# Patient Record
Sex: FEMALE | URBAN - METROPOLITAN AREA
[De-identification: names, ages, dates, MRNs, and addresses within clinical notes are randomized per-mention and may not be internally consistent; named-entity substitution may affect disease eponyms.]

---

## 2021-10-13 ENCOUNTER — PREPPED CHART (OUTPATIENT)
Dept: URBAN - METROPOLITAN AREA CLINIC 6 | Facility: CLINIC | Age: 51
End: 2021-10-13

## 2022-02-21 NOTE — PROGRESS NOTES
Lab: [facility]    OVS    Reason: Amenorrhea/Menopause    LMP: Around May 2019    Have you had any hormone/menopausal labs drawn recently: No

## 2022-02-24 ENCOUNTER — OFFICE VISIT (OUTPATIENT)
Dept: OBGYN CLINIC | Facility: CLINIC | Age: 52
End: 2022-02-24
Payer: COMMERCIAL

## 2022-02-24 VITALS
HEIGHT: 60 IN | SYSTOLIC BLOOD PRESSURE: 120 MMHG | WEIGHT: 139.8 LBS | DIASTOLIC BLOOD PRESSURE: 80 MMHG | BODY MASS INDEX: 27.45 KG/M2

## 2022-02-24 DIAGNOSIS — N95.1 VASOMOTOR SYMPTOMS DUE TO MENOPAUSE: Primary | ICD-10-CM

## 2022-02-24 PROBLEM — F41.1 GAD (GENERALIZED ANXIETY DISORDER): Status: ACTIVE | Noted: 2021-06-07

## 2022-02-24 PROCEDURE — 99203 OFFICE O/P NEW LOW 30 MIN: CPT | Performed by: OBSTETRICS & GYNECOLOGY

## 2022-02-24 RX ORDER — PROGESTERONE 100 MG/1
1 CAPSULE ORAL
Qty: 30 CAPSULE | Refills: 1 | Status: SHIPPED | OUTPATIENT
Start: 2022-02-24 | End: 2022-03-18

## 2022-02-24 RX ORDER — ALPRAZOLAM 0.5 MG/1
TABLET ORAL
COMMUNITY
Start: 2022-02-11

## 2022-02-24 RX ORDER — ESTRADIOL 0.04 MG/D
1 FILM, EXTENDED RELEASE TRANSDERMAL 2 TIMES WEEKLY
Qty: 8 PATCH | Refills: 1 | Status: SHIPPED | OUTPATIENT
Start: 2022-02-24 | End: 2022-03-24

## 2022-03-02 NOTE — PROGRESS NOTES
Assessment/Plan:    We discussed options for treatment of menopause symptoms     - OTC remedies (she is already on DIM estrogen metabolism), we specifically discussed black cohosh   - low dose SSRI/SNRI    - HRT with estrogen and progesterone as she has intact uterus  She is most interested in a trial of HRT  We discussed the risks and benefits at length  She will return in 4-6wks for follow up of symptoms/recheck  She will stop her DIM supplement at this time  We discussed risks of VTE, breast cancer, etc      We also discussed the benefits of aerobic exercise  Vasomotor symptoms due to menopause  -     Progesterone 100 MG CAPS; Take 1 capsule by mouth daily at bedtime  -     estradiol (Vivelle-Dot) 0 0375 MG/24HR; Place 1 patch on the skin 2 (two) times a week      Subjective:      Patient ID: Denver Person is a 49 y o  female  Brady Deras presents today to discuss her ongoing vasomotor and other symptoms of menopause  She has had significant hot flashes/night sweats  Not sleeping well  Mood changes, more irritable  She is very interested in treatment of her symptoms  She has no history of breast cancer, VTE, liver disease, CHD  She is only on vitamin supplementation and xanax PRN       Patient Active Problem List   Diagnosis    ABNER (generalized anxiety disorder)       Current Outpatient Medications:     ALPRAZolam (XANAX) 0 5 mg tablet, TAKE 1 TABLET BY ORAL ROUTE EVERY 6 HOURS AS NEEDED FOR ANXIETY, Disp: , Rfl:     Cholecalciferol (VITAMIN D3 PO), Take by mouth, Disp: , Rfl:     Cyanocobalamin (B-12 PO), Take by mouth, Disp: , Rfl:     Ferrous Sulfate (IRON PO), Take by mouth, Disp: , Rfl:     NON FORMULARY, DIM Estrogen Metabolism, Disp: , Rfl:     estradiol (Vivelle-Dot) 0 0375 MG/24HR, Place 1 patch on the skin 2 (two) times a week, Disp: 8 patch, Rfl: 1    Progesterone 100 MG CAPS, Take 1 capsule by mouth daily at bedtime, Disp: 30 capsule, Rfl: 1    The following portions of the patient's history were reviewed and updated as appropriate: allergies, current medications, past surgical history and problem list     Review of Systems   Constitutional: Positive for diaphoresis and unexpected weight change  Chills: hot flashes/night sweats  Genitourinary: Negative for vaginal bleeding  Psychiatric/Behavioral: Positive for agitation and sleep disturbance  Objective:      /80 (BP Location: Right arm, Patient Position: Sitting, Cuff Size: Standard)   Ht 5' (1 524 m)   Wt 63 4 kg (139 lb 12 8 oz)   LMP 05/01/2019 (Exact Date)   BMI 27 30 kg/m²          Physical Exam  Vitals and nursing note reviewed  Constitutional:       Appearance: Normal appearance  Neurological:      General: No focal deficit present  Mental Status: She is alert     Psychiatric:         Mood and Affect: Mood normal          Behavior: Behavior normal

## 2022-03-07 ENCOUNTER — TELEPHONE (OUTPATIENT)
Dept: OBGYN CLINIC | Facility: CLINIC | Age: 52
End: 2022-03-07

## 2022-03-07 NOTE — TELEPHONE ENCOUNTER
Pt has questions about the medications ordered by CT and feels she is becoming irritable and said something is off     Please advise

## 2022-03-10 NOTE — TELEPHONE ENCOUNTER
It is a low dose, I do not expect it to be causing her that significant of mood symptoms, however if this continues please schedule her an OV to discuss further, consider other options

## 2022-03-10 NOTE — TELEPHONE ENCOUNTER
Pt called saying she stills waiting for the Dr phone call  Pt reported to be anxious and she wants to know if it is a side effect of her medication  Please advise! Thanks!

## 2022-03-18 DIAGNOSIS — N95.1 VASOMOTOR SYMPTOMS DUE TO MENOPAUSE: ICD-10-CM

## 2022-03-18 RX ORDER — PROGESTERONE 100 MG/1
CAPSULE ORAL
Qty: 30 CAPSULE | Refills: 1 | Status: SHIPPED | OUTPATIENT
Start: 2022-03-18 | End: 2022-04-18

## 2022-03-24 ENCOUNTER — OFFICE VISIT (OUTPATIENT)
Dept: OBGYN CLINIC | Facility: CLINIC | Age: 52
End: 2022-03-24
Payer: COMMERCIAL

## 2022-03-24 VITALS
WEIGHT: 142 LBS | BODY MASS INDEX: 27.88 KG/M2 | DIASTOLIC BLOOD PRESSURE: 72 MMHG | SYSTOLIC BLOOD PRESSURE: 120 MMHG | HEIGHT: 60 IN

## 2022-03-24 DIAGNOSIS — N95.1 VASOMOTOR SYMPTOMS DUE TO MENOPAUSE: Primary | ICD-10-CM

## 2022-03-24 DIAGNOSIS — F41.1 GAD (GENERALIZED ANXIETY DISORDER): ICD-10-CM

## 2022-03-24 PROCEDURE — 99214 OFFICE O/P EST MOD 30 MIN: CPT | Performed by: OBSTETRICS & GYNECOLOGY

## 2022-03-24 RX ORDER — ESTRADIOL 0.05 MG/D
1 FILM, EXTENDED RELEASE TRANSDERMAL 2 TIMES WEEKLY
Qty: 24 PATCH | Refills: 1 | Status: SHIPPED | OUTPATIENT
Start: 2022-03-24 | End: 2022-07-21 | Stop reason: SDUPTHER

## 2022-03-24 RX ORDER — BUPROPION HYDROCHLORIDE 150 MG/1
TABLET, EXTENDED RELEASE ORAL
Qty: 60 TABLET | Refills: 1 | Status: SHIPPED | OUTPATIENT
Start: 2022-03-24 | End: 2022-04-18

## 2022-03-28 PROBLEM — N95.1 VASOMOTOR SYMPTOMS DUE TO MENOPAUSE: Status: ACTIVE | Noted: 2022-03-28

## 2022-03-29 NOTE — PROGRESS NOTES
Assessment/Plan:    We discussed her current dosing, and agreed to an increase in her estradiol patch up one dosing option  She will trial this for the next 4-6 weeks  We will plan a virtual visit at that time to see how she is doing  We also discussed a trial of wellbutrin, she will start and then see how she does during this time  If mood symptoms do not improve would advise follow up with her PCP (who manages her xanax)  All questions answered  Vasomotor symptoms due to menopause  -     estradiol (Vivelle-Dot) 0 05 MG/24HR; Place 1 patch on the skin 2 (two) times a week  -     buPROPion (Wellbutrin SR) 150 mg 12 hr tablet; Take 1 tablet every morning x 3 days then increase to q12 hours  ABNER (generalized anxiety disorder)        Subjective:      Patient ID: Doroteo Arevalo is a 49 y o  female  Raegan Bis presents for follow up, at last visit initated HRT for her vasomotor symptoms of menopause, as well as mood symptoms  She has had improvement in her VMS, although not complete resolution, does have some slightly improved sleep  She is concerned about her anxiety and mood symptoms still, and does not think that she can continue without further treatment of this  Has used a variety of medication in the past, and after discussion of options would like to restart wellbutrin as she has tolerated this well in the past      No vaginal bleeding, headaches, other concerns  Tolerating patch and progesterone without difficulty            The following portions of the patient's history were reviewed and updated as appropriate: allergies, current medications and problem list     Patient Active Problem List   Diagnosis    ABNER (generalized anxiety disorder)    Vasomotor symptoms due to menopause       Current Outpatient Medications:     ALPRAZolam (XANAX) 0 5 mg tablet, TAKE 1 TABLET BY ORAL ROUTE EVERY 6 HOURS AS NEEDED FOR ANXIETY, Disp: , Rfl:     Cholecalciferol (VITAMIN D3 PO), Take by mouth, Disp: , Rfl:     Cyanocobalamin (B-12 PO), Take by mouth, Disp: , Rfl:     Ferrous Sulfate (IRON PO), Take by mouth, Disp: , Rfl:     Progesterone 100 MG CAPS, TAKE 1 CAPSULE BY MOUTH EVERYDAY AT BEDTIME, Disp: 30 capsule, Rfl: 1    buPROPion (Wellbutrin SR) 150 mg 12 hr tablet, Take 1 tablet every morning x 3 days then increase to q12 hours  , Disp: 60 tablet, Rfl: 1    estradiol (Vivelle-Dot) 0 05 MG/24HR, Place 1 patch on the skin 2 (two) times a week, Disp: 24 patch, Rfl: 1    No Known Allergies      Review of Systems      Objective:      /72 (BP Location: Left arm, Patient Position: Sitting, Cuff Size: Adult)   Ht 5' (1 524 m)   Wt 64 4 kg (142 lb)   LMP 05/01/2019 (Exact Date)   BMI 27 73 kg/m²          Physical Exam  Vitals and nursing note reviewed  Constitutional:       Appearance: Normal appearance  She is normal weight  Neurological:      General: No focal deficit present  Mental Status: She is alert     Psychiatric:         Mood and Affect: Mood normal          Behavior: Behavior normal

## 2022-04-15 DIAGNOSIS — N95.1 VASOMOTOR SYMPTOMS DUE TO MENOPAUSE: ICD-10-CM

## 2022-04-18 RX ORDER — BUPROPION HYDROCHLORIDE 150 MG/1
TABLET, EXTENDED RELEASE ORAL
Qty: 60 TABLET | Refills: 1 | Status: SHIPPED | OUTPATIENT
Start: 2022-04-18 | End: 2022-05-12

## 2022-04-18 RX ORDER — PROGESTERONE 100 MG/1
CAPSULE ORAL
Qty: 30 CAPSULE | Refills: 1 | Status: SHIPPED | OUTPATIENT
Start: 2022-04-18 | End: 2022-05-12

## 2022-04-29 ENCOUNTER — TELEMEDICINE (OUTPATIENT)
Dept: OBGYN CLINIC | Facility: CLINIC | Age: 52
End: 2022-04-29
Payer: COMMERCIAL

## 2022-04-29 VITALS — BODY MASS INDEX: 22.24 KG/M2 | HEIGHT: 65 IN | WEIGHT: 133.5 LBS

## 2022-04-29 DIAGNOSIS — Z79.890 HORMONE REPLACEMENT THERAPY (HRT): Primary | ICD-10-CM

## 2022-04-29 PROCEDURE — 99212 OFFICE O/P EST SF 10 MIN: CPT | Performed by: OBSTETRICS & GYNECOLOGY

## 2022-04-29 NOTE — PROGRESS NOTES
Lab: [facility]    Virtual Visit    Discuss medication follow-up for menopause  Pt  using Estradiol and Wellbutrin   How are you doing today?  "wonderfuly"     Any questions/concerns: "Not at all"

## 2022-05-12 DIAGNOSIS — N95.1 VASOMOTOR SYMPTOMS DUE TO MENOPAUSE: ICD-10-CM

## 2022-05-12 RX ORDER — BUPROPION HYDROCHLORIDE 150 MG/1
TABLET, EXTENDED RELEASE ORAL
Qty: 60 TABLET | Refills: 1 | Status: SHIPPED | OUTPATIENT
Start: 2022-05-12 | End: 2022-06-10

## 2022-05-12 RX ORDER — PROGESTERONE 100 MG/1
CAPSULE ORAL
Qty: 30 CAPSULE | Refills: 1 | Status: SHIPPED | OUTPATIENT
Start: 2022-05-12 | End: 2022-06-10

## 2022-05-20 ENCOUNTER — TELEPHONE (OUTPATIENT)
Dept: OBGYN CLINIC | Facility: CLINIC | Age: 52
End: 2022-05-20

## 2022-05-20 NOTE — TELEPHONE ENCOUNTER
----- Message from Pearl Alejandro sent at 5/19/2022  7:56 PM EDT -----  Regarding: Possible follow up appointment   I am stating to have some hot flashes and my mood swings are becoming more regular and are beginning to resemble both things prior to starting HRT  They arent as bad but it is becoming a more daily occurrence for both issues and I was hoping to maybe stop it from getting worse if possible      Thank you   Simi Alejandro

## 2022-06-03 ENCOUNTER — TELEPHONE (OUTPATIENT)
Dept: OBGYN CLINIC | Facility: CLINIC | Age: 52
End: 2022-06-03

## 2022-06-03 DIAGNOSIS — Z12.31 ENCOUNTER FOR SCREENING MAMMOGRAM FOR MALIGNANT NEOPLASM OF BREAST: Primary | ICD-10-CM

## 2022-06-03 NOTE — TELEPHONE ENCOUNTER
Pt called to ask for mammo script  She goes to [place] for [employer]  Last mammo 7/14/21  Can we mail to her?

## 2022-06-09 DIAGNOSIS — N95.1 VASOMOTOR SYMPTOMS DUE TO MENOPAUSE: ICD-10-CM

## 2022-06-10 RX ORDER — PROGESTERONE 100 MG/1
CAPSULE ORAL
Qty: 90 CAPSULE | Refills: 1 | Status: SHIPPED | OUTPATIENT
Start: 2022-06-10

## 2022-06-10 RX ORDER — BUPROPION HYDROCHLORIDE 150 MG/1
TABLET, EXTENDED RELEASE ORAL
Qty: 180 TABLET | Refills: 1 | Status: SHIPPED | OUTPATIENT
Start: 2022-06-10

## 2022-06-28 ENCOUNTER — TELEPHONE (OUTPATIENT)
Dept: OBGYN CLINIC | Facility: CLINIC | Age: 52
End: 2022-06-28

## 2022-07-12 ENCOUNTER — TELEPHONE (OUTPATIENT)
Dept: OBGYN CLINIC | Facility: CLINIC | Age: 52
End: 2022-07-12

## 2022-07-12 NOTE — TELEPHONE ENCOUNTER
Pt called to let Joseph Julian know that she has started to bleed as of yesterday with the hormone replacement  What is her next step and her annual is for 7/21  Please advise, thank you

## 2022-07-21 ENCOUNTER — ANNUAL EXAM (OUTPATIENT)
Dept: OBGYN CLINIC | Facility: CLINIC | Age: 52
End: 2022-07-21
Payer: COMMERCIAL

## 2022-07-21 VITALS
BODY MASS INDEX: 25.09 KG/M2 | DIASTOLIC BLOOD PRESSURE: 62 MMHG | WEIGHT: 127.8 LBS | SYSTOLIC BLOOD PRESSURE: 102 MMHG | HEIGHT: 60 IN

## 2022-07-21 DIAGNOSIS — Z01.419 ENCOUNTER FOR GYNECOLOGICAL EXAMINATION (GENERAL) (ROUTINE) WITHOUT ABNORMAL FINDINGS: Primary | ICD-10-CM

## 2022-07-21 DIAGNOSIS — N95.1 VASOMOTOR SYMPTOMS DUE TO MENOPAUSE: ICD-10-CM

## 2022-07-21 DIAGNOSIS — N95.0 POST-MENOPAUSAL BLEEDING: ICD-10-CM

## 2022-07-21 PROCEDURE — G0145 SCR C/V CYTO,THINLAYER,RESCR: HCPCS | Performed by: OBSTETRICS & GYNECOLOGY

## 2022-07-21 PROCEDURE — S0612 ANNUAL GYNECOLOGICAL EXAMINA: HCPCS | Performed by: OBSTETRICS & GYNECOLOGY

## 2022-07-21 PROCEDURE — G0476 HPV COMBO ASSAY CA SCREEN: HCPCS | Performed by: OBSTETRICS & GYNECOLOGY

## 2022-07-21 RX ORDER — ESTRADIOL 0.05 MG/D
1 FILM, EXTENDED RELEASE TRANSDERMAL 2 TIMES WEEKLY
Qty: 24 PATCH | Refills: 1 | Status: SHIPPED | OUTPATIENT
Start: 2022-07-21

## 2022-07-21 NOTE — PROGRESS NOTES
Pt is here for routine annual exam   No concerns at this time    LMP: postmenopausal with vaginal bleeding after starting HRT    Pap x2 per patient/collected today   Mammogram: 7/2022 BIR[facility] 2  Colonoscopy 12/2021 x10yr recall     Not currently sexually active currently going through a divorce     Family History  Breast cancer Mat   Aunt   Colon/ovarian denies

## 2022-07-21 NOTE — PROGRESS NOTES
Haily Alejandro   1970    CC:  Yearly exam    S:  49 y o  female here for yearly exam  She is postmenopausal - did have one episode of bleeding like a period  Had usual symptoms - breast tenderness, etc prior to onset  Felt like a normal cycle  Is on HRT  She denies vaginal discharge, itching, odor or dryness  Sexual activity: She is not currently sexually active, no partner (going through divorce)  She does not report urinary incontinence, urinary concerns, bowel problems, breast concerns  Last Pap: 2yrs ago - normal   Last Mammo: 7/15/22 - [facility] 2 ([place])  Last Colonoscopy: 12/2021 - 10yr recall    We reviewed ASCCP guidelines for Pap testing  Family hx of breast cancer: M Aunt  Family hx of ovarian cancer: no  Family hx of colon cancer: no      Current Outpatient Medications:     ALPRAZolam (XANAX) 0 5 mg tablet, TAKE 1 TABLET BY ORAL ROUTE EVERY 6 HOURS AS NEEDED FOR ANXIETY, Disp: , Rfl:     buPROPion (WELLBUTRIN SR) 150 mg 12 hr tablet, TAKE 1 TABLET EVERY MORNING X 3 DAYS THEN INCREASE TO EVERY 12 HOURS, Disp: 180 tablet, Rfl: 1    Cholecalciferol (VITAMIN D3 PO), Take by mouth, Disp: , Rfl:     Cyanocobalamin (B-12 PO), Take by mouth, Disp: , Rfl:     estradiol (Vivelle-Dot) 0 05 MG/24HR, Place 1 patch on the skin 2 (two) times a week, Disp: 24 patch, Rfl: 1    Ferrous Sulfate (IRON PO), Take by mouth, Disp: , Rfl:     Progesterone 100 MG CAPS, TAKE 1 CAPSULE BY MOUTH EVERYDAY AT BEDTIME, Disp: 90 capsule, Rfl: 1  Patient Active Problem List   Diagnosis    ABNER (generalized anxiety disorder)    Vasomotor symptoms due to menopause     Family History   Problem Relation Age of Onset    Breast cancer Maternal Aunt     Colon cancer Neg Hx     Ovarian cancer Neg Hx      History reviewed  No pertinent past medical history  Review of Systems   Respiratory: Negative  Cardiovascular: Negative  Gastrointestinal: Negative for constipation and diarrhea     Genitourinary: Negative for difficulty urinating, pelvic pain, vaginal bleeding, vaginal discharge, itching or odor  O:  Blood pressure 102/62, height 5' (1 524 m), weight 58 kg (127 lb 12 8 oz), last menstrual period 05/01/2019  Patient appears well and is not in distress  Breasts are symmetrical without mass, tenderness, nipple discharge, skin changes or adenopathy, implants present bilaterally  Abdomen is soft and nontender without masses  External genitals are normal without lesions or rashes  Urethral meatus and urethra are normal  Bladder is normal to palpation  Vagina is normal without discharge or bleeding, generalized atrophic changes present   Cervix is normal without discharge or lesion  Uterus is normal, mobile, nontender without palpable mass  Adnexa are normal, nontender, without palpable mass  A:  Yearly exam      P:   Pap & HPV today   Mammo up to date   Colon Cancer Screening up to date      Bleeding - will check pelvic US, if endometrial stripe thickened to consider EMB  To call if any continue bleeding  RTO one year for yearly exam or sooner as needed

## 2022-07-22 LAB
HPV HR 12 DNA CVX QL NAA+PROBE: NEGATIVE
HPV16 DNA CVX QL NAA+PROBE: NEGATIVE
HPV18 DNA CVX QL NAA+PROBE: NEGATIVE

## 2022-07-27 LAB
LAB AP GYN PRIMARY INTERPRETATION: NORMAL
Lab: NORMAL

## 2022-08-02 ENCOUNTER — TELEPHONE (OUTPATIENT)
Dept: PSYCHIATRY | Facility: CLINIC | Age: 52
End: 2022-08-02

## 2022-08-02 NOTE — TELEPHONE ENCOUNTER
Pt wanted med mgmt I let her know about the wait list  She declined going on the wait list at this time

## 2022-10-20 DIAGNOSIS — N95.1 VASOMOTOR SYMPTOMS DUE TO MENOPAUSE: ICD-10-CM

## 2022-10-20 RX ORDER — ESTRADIOL 0.05 MG/D
1 FILM, EXTENDED RELEASE TRANSDERMAL 2 TIMES WEEKLY
Qty: 24 PATCH | Refills: 0 | Status: CANCELLED | OUTPATIENT
Start: 2022-10-20

## 2022-12-08 ENCOUNTER — TELEPHONE (OUTPATIENT)
Dept: OBGYN CLINIC | Facility: CLINIC | Age: 52
End: 2022-12-08

## 2022-12-08 NOTE — TELEPHONE ENCOUNTER
----- Message from Reynaldo Fitch sent at 12/7/2022  9:14 PM EST -----  Regarding: Hrt patch   Contact: [phone]  Hi Dr Chantale Valle,     I recently went away and forgot to take my patches  with me  I went approximately 4 days without changing it  I have been feeling similar to the way I felt before starting HRT as far as my mood and hot flashes, not being able to sleep as well and I also feel very bloated and feel like I’ve gained some weight  I have put on a new patch but since doing that I have now started to have very sore /sensitive nipples  Would this all be a normal “side effect” of my going without changing my patch for too long and then starting up again when I got back from vacation?      Thank you     Axel Paul

## 2022-12-26 DIAGNOSIS — N95.1 VASOMOTOR SYMPTOMS DUE TO MENOPAUSE: ICD-10-CM

## 2022-12-27 RX ORDER — PROGESTERONE 100 MG/1
CAPSULE ORAL
Qty: 90 CAPSULE | Refills: 1 | Status: SHIPPED | OUTPATIENT
Start: 2022-12-27

## 2022-12-27 RX ORDER — BUPROPION HYDROCHLORIDE 150 MG/1
TABLET, EXTENDED RELEASE ORAL
Qty: 180 TABLET | Refills: 1 | Status: SHIPPED | OUTPATIENT
Start: 2022-12-27

## 2023-01-20 ENCOUNTER — TELEPHONE (OUTPATIENT)
Dept: OBGYN CLINIC | Facility: CLINIC | Age: 53
End: 2023-01-20

## 2023-01-20 DIAGNOSIS — N95.1 VASOMOTOR SYMPTOMS DUE TO MENOPAUSE: ICD-10-CM

## 2023-01-20 RX ORDER — ESTRADIOL 0.05 MG/D
1 FILM, EXTENDED RELEASE TRANSDERMAL 2 TIMES WEEKLY
Qty: 8 PATCH | Refills: 0 | Status: SHIPPED | OUTPATIENT
Start: 2023-01-23

## 2023-02-11 DIAGNOSIS — N95.1 VASOMOTOR SYMPTOMS DUE TO MENOPAUSE: ICD-10-CM

## 2023-02-13 RX ORDER — PROGESTERONE 100 MG/1
CAPSULE ORAL
Qty: 90 CAPSULE | Refills: 2 | Status: SHIPPED | OUTPATIENT
Start: 2023-02-13

## 2023-02-17 ENCOUNTER — TELEPHONE (OUTPATIENT)
Dept: OBGYN CLINIC | Facility: CLINIC | Age: 53
End: 2023-02-17

## 2023-02-17 NOTE — TELEPHONE ENCOUNTER
Pt last seen 7/21/22  Current meds are Wellbutrin, bid, Vivelle dot, and  progesterone caps    States her hot flashes, anxiety, anger and anxiety have started to increase to the point like it was prior to starting med  req further instructions

## 2023-02-17 NOTE — TELEPHONE ENCOUNTER
Pt is having more frequent heat flashes and is extremely tired  She is not sure if it is related to the patch or menopause  Please call pt to discuss

## 2023-02-23 DIAGNOSIS — N95.1 VASOMOTOR SYMPTOMS DUE TO MENOPAUSE: ICD-10-CM

## 2023-02-23 RX ORDER — ESTRADIOL 0.05 MG/D
1 FILM, EXTENDED RELEASE TRANSDERMAL 2 TIMES WEEKLY
Qty: 8 PATCH | Refills: 2 | Status: SHIPPED | OUTPATIENT
Start: 2023-02-23

## 2023-03-28 ENCOUNTER — OFFICE VISIT (OUTPATIENT)
Dept: OBGYN CLINIC | Facility: CLINIC | Age: 53
End: 2023-03-28

## 2023-03-28 VITALS
BODY MASS INDEX: 26.31 KG/M2 | DIASTOLIC BLOOD PRESSURE: 74 MMHG | HEIGHT: 60 IN | WEIGHT: 134 LBS | SYSTOLIC BLOOD PRESSURE: 122 MMHG

## 2023-03-28 DIAGNOSIS — N95.1 MENOPAUSAL SYMPTOMS: ICD-10-CM

## 2023-03-28 DIAGNOSIS — R63.5 WEIGHT GAIN: Primary | ICD-10-CM

## 2023-03-28 RX ORDER — ESTRADIOL 1 MG/1
1 TABLET ORAL DAILY
Qty: 30 TABLET | Refills: 11 | Status: SHIPPED | OUTPATIENT
Start: 2023-03-28
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
## 2023-04-07 ENCOUNTER — TELEPHONE (OUTPATIENT)
Dept: OBGYN CLINIC | Facility: CLINIC | Age: 53
End: 2023-04-07

## 2023-04-07 NOTE — TELEPHONE ENCOUNTER
Pt has questions about cortisol test & lab  Pt states there are 2 blue tubes and 4 white tubes in the kit- should she use all of them? She doesn't want to do more then necessary since she is paying out of pocket  Also, unsure if she should do both stress cortisol & regular cortisol  Also, pt asking if she can go to [facility]? Please call pt to discuss

## 2023-04-07 NOTE — TELEPHONE ENCOUNTER
Spoke to patient she is going [facility] instead of [facility]  She said [facility] already printed the lab slips for her- she will have them fax results  Advised her [facility] is out [facility] but I will relay msg

## 2023-05-04 DIAGNOSIS — N95.1 MENOPAUSAL SYMPTOMS: ICD-10-CM

## 2023-05-04 RX ORDER — ESTRADIOL 1 MG/1
TABLET ORAL
Qty: 90 TABLET | Refills: 4 | Status: SHIPPED | OUTPATIENT
Start: 2023-05-04

## 2023-06-14 ENCOUNTER — TELEPHONE (OUTPATIENT)
Dept: OBGYN CLINIC | Facility: CLINIC | Age: 53
End: 2023-06-14

## 2023-06-14 NOTE — TELEPHONE ENCOUNTER
Needs order for right diag mitchel and right breast ultrasound   Fax to: [phone]  They will call pt to get her scheduled once we fax the scripts

## 2023-06-22 ENCOUNTER — TELEPHONE (OUTPATIENT)
Dept: OBGYN CLINIC | Facility: CLINIC | Age: 53
End: 2023-06-22

## 2023-06-22 NOTE — TELEPHONE ENCOUNTER
Spoke to patient - results in [facility] part of care everywhere  They wanted her to do a  diagnostic mammogram of right side  Then maybe an [address]  She said she didn't need any additional orders  She is already going on 6/28 for her additional screening      She just wants to know should she continue the hormones from [facility]

## 2023-06-22 NOTE — TELEPHONE ENCOUNTER
Patient left voice message on OB line about her mammogram results and needing additional imaging as well as asking if the hormones shes taking are a concern and should she stop them

## 2023-06-22 NOTE — TELEPHONE ENCOUNTER
I do not have any hormonal concerns about this  She has only been on HRT for 3 months     May continue, but certainly free to schedule follow up visit if something comes of breast evaluation

## 2023-07-07 ENCOUNTER — TELEPHONE (OUTPATIENT)
Dept: OBGYN CLINIC | Facility: CLINIC | Age: 53
End: 2023-07-07

## 2023-07-07 DIAGNOSIS — R92.8 ABNORMAL MAMMOGRAM: Primary | ICD-10-CM

## 2023-07-07 NOTE — TELEPHONE ENCOUNTER
----- Message from Fátima Chavez MD sent at 7/7/2023  9:29 AM EDT -----  Reviewed Dilip Snare report/have reviewed discussions about hormones, etc.    No concerns - but please order Diagnostic R mammo and US for the recommended 6 month follow up

## 2023-07-14 DIAGNOSIS — N95.1 VASOMOTOR SYMPTOMS DUE TO MENOPAUSE: ICD-10-CM

## 2023-07-14 RX ORDER — PROGESTERONE 100 MG/1
1 CAPSULE ORAL
Qty: 90 CAPSULE | Refills: 0 | Status: CANCELLED | OUTPATIENT
Start: 2023-07-14

## 2023-07-27 ENCOUNTER — ANNUAL EXAM (OUTPATIENT)
Age: 53
End: 2023-07-27
Payer: COMMERCIAL

## 2023-07-27 VITALS
HEIGHT: 61 IN | SYSTOLIC BLOOD PRESSURE: 120 MMHG | BODY MASS INDEX: 23.49 KG/M2 | DIASTOLIC BLOOD PRESSURE: 74 MMHG | WEIGHT: 124.4 LBS

## 2023-07-27 DIAGNOSIS — Z12.31 ENCOUNTER FOR SCREENING MAMMOGRAM FOR MALIGNANT NEOPLASM OF BREAST: ICD-10-CM

## 2023-07-27 DIAGNOSIS — N95.1 VASOMOTOR SYMPTOMS DUE TO MENOPAUSE: ICD-10-CM

## 2023-07-27 DIAGNOSIS — Z01.419 ENCNTR FOR GYN EXAM (GENERAL) (ROUTINE) W/O ABN FINDINGS: Primary | ICD-10-CM

## 2023-07-27 PROCEDURE — S0612 ANNUAL GYNECOLOGICAL EXAMINA: HCPCS | Performed by: OBSTETRICS & GYNECOLOGY

## 2023-07-27 RX ORDER — VALACYCLOVIR HYDROCHLORIDE 500 MG/1
TABLET, FILM COATED ORAL
COMMUNITY
Start: 2023-07-17

## 2023-07-27 RX ORDER — BUPROPION HYDROCHLORIDE 150 MG/1
150 TABLET, EXTENDED RELEASE ORAL 2 TIMES DAILY
Qty: 180 TABLET | Refills: 1 | Status: SHIPPED | OUTPATIENT
Start: 2023-07-27

## 2023-07-27 NOTE — PROGRESS NOTES
Olamide Alejandro   1970    CC:  Yearly exam    S:  47 y.o. female here for yearly exam. She is postmenopausal and has had no vaginal bleeding. She denies vaginal discharge, itching, odor or dryness. Sexual activity: She is sexually active without pain, bleeding. She does not report urinary incontinence, urinary concerns, bowel problems, breast concerns. Last Pap: 7/21/22 - NILM, Neg HPV  Last Mammo:  6/29/23 - BiRad 3, recall imaging ordered  Last Colonoscopy: 12/2/21 - 10yr recall     We reviewed ASCCP guidelines for Pap testing. Family hx of breast cancer: M Aunt, Cousin  Family hx of ovarian cancer: no  Family hx of colon cancer: no      Current Outpatient Medications:   •  buPROPion (WELLBUTRIN SR) 150 mg 12 hr tablet, TAKE 1 TABLET EVERY MORNING X 3 DAYS THEN INCREASE TO EVERY 12 HOURS, Disp: 180 tablet, Rfl: 1  •  Cholecalciferol (VITAMIN D3 PO), Take by mouth, Disp: , Rfl:   •  Cyanocobalamin (B-12 PO), Take by mouth, Disp: , Rfl:   •  estradiol (ESTRACE) 1 mg tablet, TAKE 1 TABLET BY MOUTH EVERY DAY, Disp: 90 tablet, Rfl: 4  •  Ferrous Sulfate (IRON PO), Take by mouth, Disp: , Rfl:   •  Progesterone 100 MG CAPS, TAKE 1 CAPSULE BY MOUTH EVERYDAY AT BEDTIME, Disp: 90 capsule, Rfl: 3  •  valACYclovir (VALTREX) 500 mg tablet, , Disp: , Rfl:   Patient Active Problem List   Diagnosis   • ABNER (generalized anxiety disorder)   • Vasomotor symptoms due to menopause     Family History   Problem Relation Age of Onset   • Breast cancer Maternal Aunt    • Breast cancer Cousin         Maternal   • Colon cancer Neg Hx    • Ovarian cancer Neg Hx      Past Medical History:   Diagnosis Date   • Abnormal Pap smear of cervix     I was told after a biospy it was just good bacteria present   • Depression     ongoing        Review of Systems   Respiratory: Negative. Cardiovascular: Negative. Gastrointestinal: Negative for constipation and diarrhea.    Genitourinary: Negative for difficulty urinating, pelvic pain, vaginal bleeding, vaginal discharge, itching or odor. O:  Blood pressure 120/74, height 5' 1.02" (1.55 m), weight 56.4 kg (124 lb 6.4 oz), last menstrual period 05/01/2019. Patient appears well and is not in distress  Breasts are symmetrical without mass, tenderness, nipple discharge, skin changes or adenopathy. Abdomen is soft and nontender without masses. External genitals are normal without lesions or rashes. Urethral meatus and urethra are normal  Bladder is normal to palpation  Vagina is normal without discharge or bleeding, generalized atrophic changes present   Cervix is normal without discharge or lesion. Uterus is normal, mobile, nontender without palpable mass. Adnexa are normal, nontender, without palpable mass. A:  Yearly exam.     P:   Pap & HPV up to date  Mammo follow up ordered   Colon Cancer Screening up to date   Continue HRT      RTO one year for yearly exam or sooner as needed.

## 2023-09-06 DIAGNOSIS — N95.1 MENOPAUSAL SYMPTOMS: ICD-10-CM

## 2023-09-06 RX ORDER — ESTRADIOL 1 MG/1
1 TABLET ORAL DAILY
Qty: 90 TABLET | Refills: 0 | Status: CANCELLED | OUTPATIENT
Start: 2023-09-06

## 2023-09-27 ENCOUNTER — TELEPHONE (OUTPATIENT)
Age: 53
End: 2023-09-27

## 2023-09-27 NOTE — TELEPHONE ENCOUNTER
Contacted patient to review concern. Patient has been experiencing sharp shooting pains intermittently in her left breast. Reviewed need for exam at her appointment tomorrow morning with Dr. Grisel Justice to determine next steps. Reviewed orders for Right breast mammogram and ultrasound in system to be scheduled and potential for same plan for new left breast pain. Patient verbalized understanding.

## 2023-09-27 NOTE — TELEPHONE ENCOUNTER
Pt called about left breast pain that started about 3-4 days ago. Not constant, and is a sharp-shooting pain. She is having ultrasounds on her right breast, and scheduled an OVS with Dr. Marzena Rubio. She would still like to speak to a nurse to see what the best course of action should be.

## 2023-09-28 ENCOUNTER — TELEPHONE (OUTPATIENT)
Age: 53
End: 2023-09-28

## 2023-09-28 ENCOUNTER — OFFICE VISIT (OUTPATIENT)
Age: 53
End: 2023-09-28
Payer: COMMERCIAL

## 2023-09-28 VITALS — SYSTOLIC BLOOD PRESSURE: 124 MMHG | BODY MASS INDEX: 26.17 KG/M2 | DIASTOLIC BLOOD PRESSURE: 80 MMHG | WEIGHT: 138.6 LBS

## 2023-09-28 DIAGNOSIS — N64.4 BREAST PAIN, LEFT: Primary | ICD-10-CM

## 2023-09-28 PROCEDURE — 99213 OFFICE O/P EST LOW 20 MIN: CPT | Performed by: OBSTETRICS & GYNECOLOGY

## 2023-09-28 RX ORDER — ACYCLOVIR 50 MG/G
OINTMENT TOPICAL
COMMUNITY
Start: 2023-08-08

## 2023-09-28 NOTE — TELEPHONE ENCOUNTER
Faxed diagnostic mammogram and breast ultrasound order for both breast to [address] [place] [address] at . Notified patient via telephone.

## 2023-09-28 NOTE — TELEPHONE ENCOUNTER
Pt. Called after her visit with Dr. Winsome Ramos and was unable to schedule a mammogram or breast ultrasound until late November. [address] [place] [address] is more convenient of a location for her, and is covered by insurance, and would like her orders faxed over as soon as possible if we can. Can be reached via primary phone number with any questions or concerns.     [address] Fax: [phone]

## 2023-09-28 NOTE — PROGRESS NOTES
Assessment/Plan:    No abnormal findings on exam.  Suspect could be hormonal due to use of HRT. Will check diagnostic imaging to cover all bases. Breast pain, left  -     Mammo diagnostic left w 3d & cad; Future  -     US breast left limited (diagnostic); Future    Other orders  -     acyclovir (ZOVIRAX) 5 % ointment; APPLY 3-5 TIMES A DAY TO THE AFFECTED AREA FOR 4 DAYS        Subjective:      Patient ID: Tripp Tavarez is a 47 y.o. female. Nik Selby presents for a problem visit. Has noticed shooting left breast pain near her nipple. This is the same pain she would have when she was menstruating and ovulating - but understandably has not had since going into menopause. This has been occurring for several days now. No nipple discharge. Up to date with mammography. The following portions of the patient's history were reviewed and updated as appropriate: allergies, current medications and problem list.    Review of Systems      Objective:      /80 (BP Location: Left arm, Patient Position: Sitting, Cuff Size: Standard)   Wt 62.9 kg (138 lb 9.6 oz)   LMP 05/01/2019 (Exact Date)   BMI 26.17 kg/m²          Physical Exam  Chest:   Breasts:     Left: Tenderness present. No swelling, inverted nipple, mass or nipple discharge.       Comments: Implant present

## 2023-10-30 DIAGNOSIS — B00.9 HSV-1 INFECTION: Primary | ICD-10-CM

## 2023-10-30 RX ORDER — VALACYCLOVIR HYDROCHLORIDE 500 MG/1
500 TABLET, FILM COATED ORAL 2 TIMES DAILY
Qty: 6 TABLET | Refills: 0 | Status: SHIPPED | OUTPATIENT
Start: 2023-10-30 | End: 2023-11-02

## 2023-10-30 NOTE — TELEPHONE ENCOUNTER
Patient called stating she feels a cold sore coming on and would like refill for her Valtrex.     Order pended and sent to Dr. Alison Peña for approval.

## 2024-01-02 ENCOUNTER — PATIENT MESSAGE (OUTPATIENT)
Age: 54
End: 2024-01-02

## 2024-01-05 DIAGNOSIS — B00.9 HSV-1 INFECTION: Primary | ICD-10-CM

## 2024-01-08 RX ORDER — VALACYCLOVIR HYDROCHLORIDE 500 MG/1
500 TABLET, FILM COATED ORAL 2 TIMES DAILY
Qty: 6 TABLET | Refills: 0 | Status: SHIPPED | OUTPATIENT
Start: 2024-01-08 | End: 2024-01-11

## 2024-01-19 ENCOUNTER — TELEPHONE (OUTPATIENT)
Age: 54
End: 2024-01-19

## 2024-01-19 DIAGNOSIS — Z12.31 SCREENING MAMMOGRAM FOR BREAST CANCER: Primary | ICD-10-CM

## 2024-01-19 NOTE — TELEPHONE ENCOUNTER
Spoke with patient to review mammogram results and plan to return to routine screening mammograms in 6 months. Order entered. Patient verbalizes understanding.

## 2024-02-06 DIAGNOSIS — N95.1 VASOMOTOR SYMPTOMS DUE TO MENOPAUSE: ICD-10-CM

## 2024-02-06 RX ORDER — BUPROPION HYDROCHLORIDE 150 MG/1
150 TABLET, EXTENDED RELEASE ORAL 2 TIMES DAILY
Qty: 180 TABLET | Refills: 1 | Status: SHIPPED | OUTPATIENT
Start: 2024-02-06

## 2024-04-19 DIAGNOSIS — N95.1 VASOMOTOR SYMPTOMS DUE TO MENOPAUSE: ICD-10-CM

## 2024-04-19 RX ORDER — PROGESTERONE 100 MG/1
CAPSULE ORAL
Qty: 90 CAPSULE | Refills: 3 | Status: SHIPPED | OUTPATIENT
Start: 2024-04-19

## 2024-06-15 DIAGNOSIS — Z00.6 ENCOUNTER FOR EXAMINATION FOR NORMAL COMPARISON OR CONTROL IN CLINICAL RESEARCH PROGRAM: ICD-10-CM

## 2024-06-25 ENCOUNTER — TELEPHONE (OUTPATIENT)
Age: 54
End: 2024-06-25

## 2024-06-25 NOTE — TELEPHONE ENCOUNTER
Called patient to reschedule upcoming appointment, left message on voicemail per communication consent.

## 2024-06-26 ENCOUNTER — OFFICE VISIT (OUTPATIENT)
Dept: OBGYN CLINIC | Facility: OTHER | Age: 54
End: 2024-06-26
Payer: COMMERCIAL

## 2024-06-26 VITALS
HEIGHT: 61 IN | SYSTOLIC BLOOD PRESSURE: 119 MMHG | BODY MASS INDEX: 26.66 KG/M2 | HEART RATE: 75 BPM | DIASTOLIC BLOOD PRESSURE: 78 MMHG | WEIGHT: 141.2 LBS

## 2024-06-26 DIAGNOSIS — M22.2X1 PATELLOFEMORAL PAIN SYNDROME OF BOTH KNEES: Primary | ICD-10-CM

## 2024-06-26 DIAGNOSIS — M22.2X2 PATELLOFEMORAL PAIN SYNDROME OF BOTH KNEES: Primary | ICD-10-CM

## 2024-06-26 DIAGNOSIS — Z12.31 SCREENING MAMMOGRAM FOR BREAST CANCER: ICD-10-CM

## 2024-06-26 DIAGNOSIS — M25.561 PAIN IN BOTH KNEES, UNSPECIFIED CHRONICITY: ICD-10-CM

## 2024-06-26 DIAGNOSIS — M76.32 IT BAND SYNDROME, LEFT: ICD-10-CM

## 2024-06-26 DIAGNOSIS — M25.562 PAIN IN BOTH KNEES, UNSPECIFIED CHRONICITY: ICD-10-CM

## 2024-06-26 PROCEDURE — 99203 OFFICE O/P NEW LOW 30 MIN: CPT | Performed by: ORTHOPAEDIC SURGERY

## 2024-06-26 RX ORDER — CALCIUM CARBONATE 500(1250)
1 TABLET,CHEWABLE ORAL DAILY
COMMUNITY
Start: 2024-02-15

## 2024-06-26 RX ORDER — MELOXICAM 15 MG/1
15 TABLET ORAL DAILY
COMMUNITY
Start: 2024-02-15

## 2024-06-26 RX ORDER — DIINDOLYLMETHANE 100 %
POWDER (GRAM) MISCELLANEOUS
COMMUNITY
Start: 2024-02-15

## 2024-06-26 NOTE — PROGRESS NOTES
Chief Complaint: Bilateral knee pain    HPI:    Simi Alejandro is a 53year old Female who presents today for evaluation of bilateral knee pain      Description of symptoms: Patient reports several months of bilateral knee pain without any preceding trauma.  Pain is noted primarily in the anterior aspect with some discomfort on the lateral aspect of the left knee as well.  She describes that symptoms are made worse with kneeling or squatting activities.  She has earlier been seen in this regard at [facility] and underwent aspiration/corticosteroid injection of bilateral knees on 2/19/2024.  Unfortunately, symptoms did persist despite this measure.  Patient has not tried any formal physical therapy in this regard.  Patient denies having any known history of Lyme's disease or gout.  She also denies any tick bites or known rheumatological problems.      Plain radiograph of bilateral patellas performed externally at [facility] on 2/19/2024 very reported to have small bilateral knee effusions with a chronic appearing fragmentation of the left tibial tuberosity likely old Osgood-Schlatter's disease.    Patient Active Problem List   Diagnosis    ABNER (generalized anxiety disorder)    Vasomotor symptoms due to menopause        Current Outpatient Medications on File Prior to Visit   Medication Sig Dispense Refill    acyclovir (ZOVIRAX) 5 % ointment APPLY 3-5 TIMES A DAY TO THE AFFECTED AREA FOR 4 DAYS      buPROPion (WELLBUTRIN SR) 150 mg 12 hr tablet TAKE 1 TABLET BY MOUTH TWICE A  tablet 1    Cholecalciferol (VITAMIN D3 PO) Take by mouth      Cyanocobalamin (B-12 PO) Take by mouth      estradiol (ESTRACE) 1 mg tablet TAKE 1 TABLET BY MOUTH EVERY DAY 90 tablet 4    Ferrous Sulfate (IRON PO) Take by mouth (Patient not taking: Reported on 9/28/2023)      Progesterone 100 MG CAPS TAKE 1 CAPSULE BY MOUTH EVERYDAY AT BEDTIME 90 capsule 3    valACYclovir (VALTREX) 500 mg tablet Take 1  tablet (500 mg total) by mouth 2 (two) times a day for 3 days 6 tablet 0     No current facility-administered medications on file prior to visit.        No Known Allergies     Tobacco Use: Medium Risk (3/25/2024)    Received from [facility]    Patient History     Smoking Tobacco Use: Former     Smokeless Tobacco Use: Never     Passive Exposure: Not on file        Social Determinants of Health     Tobacco Use: Medium Risk (3/25/2024)    Received from [facility]    Patient History     Smoking Tobacco Use: Former     Smokeless Tobacco Use: Never     Passive Exposure: Not on file   Alcohol Use: Not on file   Financial Resource Strain: Not on file   Food Insecurity: Not on file   Transportation Needs: Not on file   Physical Activity: Not on file   Stress: Not on file   Social Connections: Not on file   Intimate Partner Violence: Not on file   Depression: Not on file   Housing Stability: Not on file   Utilities: Not on file   Health Literacy: Not on file               Review of Systems     Body mass index is 26.66 kg/m².     Physical Exam  Vitals and nursing note reviewed.   HENT:      Head: Atraumatic.   Eyes:      Conjunctiva/sclera: Conjunctivae normal.   Cardiovascular:      Rate and Rhythm: Normal rate.      Pulses: Normal pulses.   Pulmonary:      Effort: Pulmonary effort is normal. No respiratory distress.   Neurological:      Mental Status: She is alert and oriented to person, place, and time.   Psychiatric:         Mood and Affect: Mood normal.         Behavior: Behavior normal.          Ortho Exam:    Body part: right knee    Inspection: No visible deformity.  No effusion.  No erythema.    Palpation: Tender over the patellofemoral joint area.  Mild discomfort over the medial joint line.  Some palpable patellofemoral crepitus.  Also has some mild tenderness over the Gerdy's tubercle    Range of motion: Full range of right knee motion    Special Tests: Negative valgus and varus  stress test.  Negative medial and lateral Libby's.  Negative Lachman.  Negative anterior and posterior drawer test.  Ipsilateral hip abduction strength is 4/5.    Distal Neurovascular Status: Intact, Yes    Body part: left knee    Inspection: No visible deformity.  No effusion.  No erythema.    Palpation: Some tenderness over the lateral femoral condyle at the level of iliotibial band distally.  Also has mild discomfort over the medial joint line and the lateral joint line.  Has tenderness on patellofemoral compression    Range of motion: Full range of left knee motion    Special Tests: Some tightness with the Marquis test.  Negative Lachman.  Negative medial and equivocal lateral Libby's.  Negative valgus and varus stress test.  Ipsilateral hip abduction strength is 4/5.    Distal Neurovascular Status: Intact, Yes    Procedures       Assessment:     Diagnosis ICD-10-CM Associated Orders   1. Patellofemoral pain syndrome of both knees  M22.2X1 Ambulatory Referral to Physical Therapy    M22.2X2       2. It band syndrome, left  M76.32 Ambulatory Referral to Physical Therapy      3. Pain in both knees, unspecified chronicity  M25.561 Uric acid    M25.562 Lyme Total AB W Reflex to IGM/IGG           Plan:    Explained my current clinical findings and reviewed the radiological findings with the patient.  Her bilateral knee pain is likely secondary to patellofemoral pain syndrome with associated left distal iliotibial band syndrome.  However, she has not had much relief with bilateral knee corticosteroid injection.  I recommend her to do a course of formal physical therapy rehabilitation.  Will request labs screening for Lyme's and uric acid due to her persistent symptoms.  Will follow-up in 2 months time for clinical reassessment.  If symptoms do significant persist we can consider further advanced imaging.            Portions of the record may have been created with voice recognition software. Occasional wrong word or  "\"sound alike\" substitutions may have occurred due to the inherent limitations of voice recognition software. Please review the chart carefully and recognize, using context, where substitutions/typographical errors may have occurred.           "

## 2024-06-26 NOTE — PATIENT INSTRUCTIONS
Patient Education     Strengthening Your Lower Body and Core   About this topic   Strengthening your muscles is an important part of an exercise program. Follow these steps for a good lower body strengthening program:  Schedule your program every other day. This will let your muscles rest and regain strength.  If you do work out every day, exercise different parts of your body. For example, work out your arms one day and the legs the next day.  Stop if you feel pain during an exercise. Do not overwork your muscles. This may cause harmful damage to your muscles.  Breathe out when you are doing the hard part of the exercise. Breathe in when you relax.  Muscle strain may happen if you do not follow the proper steps when doing these exercises. It may also happen if you try to do too many exercises right away.  General   Before starting with a program, ask your doctor if you are healthy enough to do these exercises. Your doctor may have you work with a , chiropractor, or physical therapist to make a safe exercise program to meet your needs.  Some of these exercises may cause lower back pain. If you have back problems like a compression fracture or a ruptured disc, doing some of these exercises could make your problem worse.  Some exercises can be done holding a small weight. You can use a can of soup or a hand weight. If the exercise gets too easy, use heavier weights or do the exercise more times.  Strengthening Exercises   Strengthening exercises keep your muscles firm and strong. Start by repeating each exercise 2 to 3 times. Work up to doing each exercise 10 times. Try to do the exercises 2 to 3 times each day. Do all exercises slowly.  Hip lifts ? Lie on your back with your knees bent and feet flat on the floor. Tighten your stomach muscles and lift your buttocks off the floor. Hold 3 to 5 seconds. Relax.  Straight leg raises lying down ? Lie on your back with one leg straight. Bend your other knee so the  foot is flat on the bed. Keeping your leg straight, lift the leg up to the level of your other knee. Lower it back down. Repeat with the other leg.  Abdominal crunches ? Lie on your back with both knees bent. Keep your feet flat on the floor. Place your hands in one of these positions. Try starting with the first position since it is the easiest. As you get better, use the other positions to make it harder.  Crunches with arms at sides.  Crunches with arms across chest.  Crunches with arms behind head. Be careful not to interlock your fingers behind your neck or head while doing crunches. This may add tension to your neck and cause strain.  Look at the ceiling. Tighten your belly muscles and lift your shoulders and upper back off the floor. Breathe out while you are doing this. Lower your shoulders to the floor. Breathe in while you are doing this. Relax your belly muscles all the way before starting another crunch.  Mini-squats ? Stand up straight in front of a counter and hold on with your hands. Have your feet spread about a foot apart. Bend your knees while keeping your back straight. Return to straight standing position. At first, start by just having a slight bend at the knee. To make it harder, bend your knees deeper or hold the position longer. Do not go any lower than 90 degrees.  Heel raises ? Hold onto a counter. Lift your heels up and rise up onto your toes. Lower yourself back down.  Arm and leg lifts on hands and knees ? Start on your hands and knees. With all of these exercises, keep your back as level as possible. If you are having trouble with this, you may want to put a small object on your back such as a book. If it falls off, you are not keeping your back level enough during the exercise.  Lift one arm up to shoulder level and hold. Lower it back down. Now, lift up the other arm and hold.  Lift one leg up and kick it straight out until it is in line with your back and hold. Lower it back down.  Now, lift up the other leg and hold.  Lift one arm and the OPPOSITE leg up at the same time and hold. Lower them down. Now, repeat using the other arm and leg. This is a very hard exercise. It may take time to be able to do this.               What will the results be?   Stronger muscles and bones  Better posture  Better balance and less risk for a fall or injury  Burn body fat and control weight  Better sleep at night and feel better during the day  Lower risk for health problems like arthritis, diabetes, osteoporosis, back pain, obesity, low mood  Helpful tips   Stay active and work out to keep your muscles strong and flexible.  Keep a healthy weight to avoid putting too much stress on your joints. Eat a healthy diet to keep your muscles healthy.  Be sure you do not hold your breath when exercising. This can raise your blood pressure. If you tend to hold your breath, try counting out loud when exercising. If any exercise bothers you, stop right away.  Try walking and swinging your arms at an easy pace for a few minutes to warm up your muscles. Do this again after exercising.  Doing exercises before a meal may be a good way to get into a routine.  If you are using weights, choose a weight that will allow you to repeat the exercise 10 times before resting. If you easily do 10 repeats, you may not be using enough weight. If you are not able to do 10 repeats, you are using too heavy of a weight.  Exercise may be slightly uncomfortable, but you should not have sharp pains. If you do get sharp pains, stop what you are doing. If the sharp pains continue, call your doctor.  Last Reviewed Date   2021-06-28  Consumer Information Use and Disclaimer   This generalized information is a limited summary of diagnosis, treatment, and/or medication information. It is not meant to be comprehensive and should be used as a tool to help the user understand and/or assess potential diagnostic and treatment options. It does NOT include  all information about conditions, treatments, medications, side effects, or risks that may apply to a specific patient. It is not intended to be medical advice or a substitute for the medical advice, diagnosis, or treatment of a health care provider based on the health care provider's examination and assessment of a patient’s specific and unique circumstances. Patients must speak with a health care provider for complete information about their health, medical questions, and treatment options, including any risks or benefits regarding use of medications. This information does not endorse any treatments or medications as safe, effective, or approved for treating a specific patient. [employer] and its affiliates disclaim any warranty or liability relating to this information or the use thereof. The use of this information is governed by the Terms of Use, available at https://www.woltersdooyoouwer.com/en/know/clinical-effectiveness-terms   Copyright   Copyright © 2024 [employer] and its affiliates and/or licensors. All rights reserved.

## 2024-07-09 ENCOUNTER — EVALUATION (OUTPATIENT)
Dept: PHYSICAL THERAPY | Facility: OTHER | Age: 54
End: 2024-07-09
Payer: COMMERCIAL

## 2024-07-09 DIAGNOSIS — M76.32 IT BAND SYNDROME, LEFT: ICD-10-CM

## 2024-07-09 DIAGNOSIS — M22.2X2 PATELLOFEMORAL PAIN SYNDROME OF BOTH KNEES: ICD-10-CM

## 2024-07-09 DIAGNOSIS — M22.2X1 PATELLOFEMORAL PAIN SYNDROME OF BOTH KNEES: ICD-10-CM

## 2024-07-09 PROCEDURE — 97162 PT EVAL MOD COMPLEX 30 MIN: CPT | Performed by: PHYSICAL THERAPIST

## 2024-07-09 PROCEDURE — 97110 THERAPEUTIC EXERCISES: CPT | Performed by: PHYSICAL THERAPIST

## 2024-07-09 PROCEDURE — 97140 MANUAL THERAPY 1/> REGIONS: CPT | Performed by: PHYSICAL THERAPIST

## 2024-07-09 NOTE — PROGRESS NOTES
PT Evaluation     Today's date: 7/10/2024  Patient name: Simi Alejandro  : 1970  MRN: [id number]  Referring provider: Yara Parra MD  Dx:   Encounter Diagnosis     ICD-10-CM    1. Patellofemoral pain syndrome of both knees  M22.2X1 Ambulatory Referral to Physical Therapy    M22.2X2       2. It band syndrome, left  M76.32 Ambulatory Referral to Physical Therapy                     Assessment    Assessment details: Simi Alejandro is a pleasant 53 y.o. female who presents for evaluation of chronic bilateral knee pain. Upon evaluation she demonstrates signs and symptoms consistent with patellofemoral pain, pes anserine bursitis. She demonstrates decreased glut med strength and quad strength, altered neurodynamics leading to poor form with squatting, kneeling, and stairs. She would benefit from skilled PT to address the impairments listed above.           Goals  STG's to be achieved in 4 weeks:  1) Patient will demonstrate normal pain free LE ROM.   2) Patient will improve LE strength by 1/2 muscle grade.   3) Patient will be able to negotiate stairs with no greater than 2/10 knee discomfort.     LTG's to be achieved in 8 weeks:  1) Patient will be independent and compliant with HEP.   2) Patient will be able to kneel with no greater than 2/10 knee discomfort.   3) Patient will demonstrate normal pain free squat mechanics.   4) Patient will score 75 or greater on FOTO.     Subjective Evaluation    History of Present Illness  Mechanism of injury: Patient reports pain in her left knee in March. At this time she increased actiity with a pyramid lifting routine. She had difficulty with stairs. She occasionally has a feeling of pain in the back of her knee and then her knee will give way. This happens periodically in both. She also has difficulty with kneeling. Patient does have a history of spinal stenosis and sciatica on the L. Patient is currently running without difficulty.       Objective     Strength/Myotome  "Testing     Left Hip     Isolated Muscles   Gluteus donnie: 4  Gluteus medius: 3+  Iliopsoas: 4+    Right Hip     Isolated Muscles   Gluteus maximums: 4  Gluteus medius: 3  Iliopsoas: 4+    Left Knee   Flexion: 5  Extension: 4+    Right Knee   Flexion: 5  Extension: 4    Additional Strength Details  Pain limiting quad strength.     Tests     Left Knee   Positive lateral Libby, medial Libby and patella-femoral grind.     Right Knee   Positive medial Libby and patella-femoral grind.   Negative lateral Libby.     General Comments:      Knee Comments  Tenderness to palpate patellar tendon b/l. Tenderness along medial joint line. Tenderness along pes anserine and medial hamstring tendons on R    Squat: valgus collapse, but know pronation, mild internal rotation.     Step down from 8 in step: valgus, pain. Improvement in pain with manual medial glide of patella             Precautions: n/a      Manuals 7/9            Mercado tape EB            ESWT patellar tendon             ESWT quad tendon                          Neuro Re-Ed             Squat with TRX 10x            Squat with TB resist at knee 10x            TRX pistol             Sidestep CLX             Glut med iso@ wall 10x 10\"            Step up             Side plank from knee w/ hip abd 4 x 15\"                         Ther Ex             bike             Sciatic nerve glide 10 x 10\"                                                                                          Ther Activity                                       Gait Training                                       Modalities                                            "

## 2024-07-11 ENCOUNTER — APPOINTMENT (OUTPATIENT)
Dept: PHYSICAL THERAPY | Facility: OTHER | Age: 54
End: 2024-07-11
Payer: COMMERCIAL

## 2024-07-15 ENCOUNTER — OFFICE VISIT (OUTPATIENT)
Dept: PHYSICAL THERAPY | Facility: OTHER | Age: 54
End: 2024-07-15
Payer: COMMERCIAL

## 2024-07-15 DIAGNOSIS — M22.2X1 PATELLOFEMORAL PAIN SYNDROME OF BOTH KNEES: Primary | ICD-10-CM

## 2024-07-15 DIAGNOSIS — M76.32 IT BAND SYNDROME, LEFT: ICD-10-CM

## 2024-07-15 DIAGNOSIS — M22.2X2 PATELLOFEMORAL PAIN SYNDROME OF BOTH KNEES: Primary | ICD-10-CM

## 2024-07-15 PROCEDURE — 97110 THERAPEUTIC EXERCISES: CPT | Performed by: PHYSICAL THERAPIST

## 2024-07-15 PROCEDURE — 97112 NEUROMUSCULAR REEDUCATION: CPT | Performed by: PHYSICAL THERAPIST

## 2024-07-15 PROCEDURE — 97140 MANUAL THERAPY 1/> REGIONS: CPT | Performed by: PHYSICAL THERAPIST

## 2024-07-18 ENCOUNTER — APPOINTMENT (OUTPATIENT)
Dept: PHYSICAL THERAPY | Facility: OTHER | Age: 54
End: 2024-07-18
Payer: COMMERCIAL

## 2024-07-23 ENCOUNTER — OFFICE VISIT (OUTPATIENT)
Dept: PHYSICAL THERAPY | Facility: OTHER | Age: 54
End: 2024-07-23
Payer: COMMERCIAL

## 2024-07-23 DIAGNOSIS — M76.32 IT BAND SYNDROME, LEFT: ICD-10-CM

## 2024-07-23 DIAGNOSIS — M22.2X2 PATELLOFEMORAL PAIN SYNDROME OF BOTH KNEES: Primary | ICD-10-CM

## 2024-07-23 DIAGNOSIS — M22.2X1 PATELLOFEMORAL PAIN SYNDROME OF BOTH KNEES: Primary | ICD-10-CM

## 2024-07-23 PROCEDURE — 97112 NEUROMUSCULAR REEDUCATION: CPT | Performed by: PHYSICAL THERAPIST

## 2024-07-23 PROCEDURE — 97110 THERAPEUTIC EXERCISES: CPT | Performed by: PHYSICAL THERAPIST

## 2024-07-23 PROCEDURE — 97140 MANUAL THERAPY 1/> REGIONS: CPT | Performed by: PHYSICAL THERAPIST

## 2024-07-23 NOTE — PROGRESS NOTES
"Daily Note     Today's date: 2024  Patient name: Simi Alejandro  : 1970  MRN: [id number]  Referring provider: Yara Parra MD  Dx: No diagnosis found.               Subjective: Patient reports good tolerance to home program. She reports being fatigued from last visits addition of BFR      Objective: See treatment diary below      Assessment: Tolerated treatment well. Patient demonstrated fatigue post treatment, exhibited good technique with therapeutic exercises, and would benefit from continued PT. Added step up reverse lunge with fatigue. Plan to continue progression of quad and glut strengthening as tolerated.       Plan: Continue per plan of care.      Precautions: n/a      Manuals 7/9 7/15 7/23          Mercado tape EB            ESWT patellar tendon  2500 p 21 Hz small metal 1.8           ESWT quad tendon   2500 p 21 Hz small metal 1.8                       Neuro Re-Ed             Squat with TRX 10x            Squat with TB resist at knee 10x            TRX pistol             Sidestep CLX  2 laps black           Glut med iso@ wall 10x 10\"            BFR Step up, reverse lunge  NV 30 3  x 15, 12\"          Side plank from knee w/ hip abd 4 x 15\"            LAQ BFR  30 1 x 15 blue TB           Legpress BFR  10# 30 3 x 15 30# 30 3 x 15          BFR mini lunge             Ther Ex             bike               Sciatic nerve glide 10 x 10\"                                                                                            Ther Activity                                       Gait Training                                       Modalities                                            "

## 2024-07-24 DIAGNOSIS — N95.1 MENOPAUSAL SYMPTOMS: ICD-10-CM

## 2024-07-24 RX ORDER — ESTRADIOL 1 MG/1
TABLET ORAL
Qty: 90 TABLET | Refills: 0 | Status: SHIPPED | OUTPATIENT
Start: 2024-07-24

## 2024-07-25 ENCOUNTER — OFFICE VISIT (OUTPATIENT)
Dept: PHYSICAL THERAPY | Facility: OTHER | Age: 54
End: 2024-07-25
Payer: COMMERCIAL

## 2024-07-25 DIAGNOSIS — M22.2X2 PATELLOFEMORAL PAIN SYNDROME OF BOTH KNEES: Primary | ICD-10-CM

## 2024-07-25 DIAGNOSIS — M22.2X1 PATELLOFEMORAL PAIN SYNDROME OF BOTH KNEES: Primary | ICD-10-CM

## 2024-07-25 PROCEDURE — 97140 MANUAL THERAPY 1/> REGIONS: CPT | Performed by: PHYSICAL THERAPIST

## 2024-07-25 PROCEDURE — 97112 NEUROMUSCULAR REEDUCATION: CPT | Performed by: PHYSICAL THERAPIST

## 2024-07-25 PROCEDURE — 97110 THERAPEUTIC EXERCISES: CPT | Performed by: PHYSICAL THERAPIST

## 2024-07-25 NOTE — PROGRESS NOTES
"Daily Note     Today's date: 2024  Patient name: Simi Alejandro  : 1970  MRN: [id number]  Referring provider: Yara Parra MD  Dx:   Encounter Diagnosis     ICD-10-CM    1. Patellofemoral pain syndrome of both knees  M22.2X1     M22.2X2           Start Time: 1700  Stop Time: 1750  Total time in clinic (min): 50 minutes  1 on 1 from 500-545, not billed remainder    Subjective: Patient reports more muscle soreness than knee soreness from last session. Continues to have the most difficulty with kneeling.       Objective: See treatment diary below      Assessment: Tolerated treatment well. Patient demonstrated fatigue post treatment, exhibited good technique with therapeutic exercises, and would benefit from continued PT. Progressed SL strengthening as charted below. Patient fatigued with progression of squat to pistol squat.       Plan: Continue per plan of care.      Precautions: ESWT 1 x a week      Manuals 7/9 7/15 7/23 7/25         Mercado tape EB            ESWT patellar tendon  2500 p 21 Hz small metal 1.8           ESWT quad tendon   2500 p 21 Hz small metal 1.8          MFDc    Quads and patellar tendon         Neuro Re-Ed             Squat with TRX 10x            Squat with TB resist at knee 10x   30 plum         TRX curtsy    BFR 30, 3 x 15         TRX pistol    30 BFR         Sidestep CLX  2 laps black           Glut med iso@ wall 10x 10\"            BFR Step up, reverse lunge  NV 30 3  x 15, 12\"          Side plank from knee w/ hip abd 4 x 15\"            LAQ BFR  30 1 x 15 blue TB  30 w/ MFDc         Legpress BFR  10# 30 3 x 15 30# 30 3 x 15 30# 30 3 x 15         BFR mini lunge             Ther Ex             bike               Sciatic nerve glide 10 x 10\"                                                                                            Ther Activity                                       Gait Training                                       Modalities                                   "

## 2024-07-29 ENCOUNTER — ANNUAL EXAM (OUTPATIENT)
Age: 54
End: 2024-07-29

## 2024-07-29 ENCOUNTER — APPOINTMENT (OUTPATIENT)
Dept: PHYSICAL THERAPY | Facility: OTHER | Age: 54
End: 2024-07-29
Payer: COMMERCIAL

## 2024-07-29 VITALS
HEIGHT: 60 IN | SYSTOLIC BLOOD PRESSURE: 108 MMHG | BODY MASS INDEX: 27.68 KG/M2 | DIASTOLIC BLOOD PRESSURE: 76 MMHG | WEIGHT: 141 LBS

## 2024-07-29 DIAGNOSIS — N95.2 VAGINAL ATROPHY: ICD-10-CM

## 2024-07-29 DIAGNOSIS — N95.1 MENOPAUSAL HOT FLUSHES: ICD-10-CM

## 2024-07-29 DIAGNOSIS — Z01.419 ENCNTR FOR GYN EXAM (GENERAL) (ROUTINE) W/O ABN FINDINGS: Primary | ICD-10-CM

## 2024-07-29 DIAGNOSIS — Z12.31 ENCOUNTER FOR SCREENING MAMMOGRAM FOR MALIGNANT NEOPLASM OF BREAST: ICD-10-CM

## 2024-07-29 DIAGNOSIS — Z11.51 SCREENING FOR HPV (HUMAN PAPILLOMAVIRUS): ICD-10-CM

## 2024-07-29 PROCEDURE — G0145 SCR C/V CYTO,THINLAYER,RESCR: HCPCS | Performed by: OBSTETRICS & GYNECOLOGY

## 2024-07-29 PROCEDURE — G0476 HPV COMBO ASSAY CA SCREEN: HCPCS | Performed by: OBSTETRICS & GYNECOLOGY

## 2024-07-29 RX ORDER — ESTRADIOL 0.1 MG/G
CREAM VAGINAL
Qty: 42.5 G | Refills: 3 | Status: SHIPPED | OUTPATIENT
Start: 2024-07-29

## 2024-07-29 NOTE — PATIENT INSTRUCTIONS
Pap today for h/o abnl paps, declines STD testing.   Mammogram is up to date.   For colon CA screening in 2031.   For DEXA scan at age 61  Vagina atrophy:  Massage dime size amount of estrace cream to vaginal opening 2-3x/wk.  Insert Revaree 1-3x/wk.  Use coconut oil daily as moisturizer and can also use as lubricant.    Hot flushes:  try Veozah.  Download coupon from .    F/u in 2-3mos.  If all is well, ok to cancel appt and RTO for annual exam.    Patient verbalized understanding of today's discussion with all questions and concerns addressed to her satisfaction.

## 2024-07-29 NOTE — PROGRESS NOTES
What we talked about today:    Patient Instructions   Pap today for h/o abnl paps, declines STD testing.   Mammogram is up to date.   For colon CA screening in .   For DEXA scan at age 61  Vagina atrophy:  Massage dime size amount of estrace cream to vaginal opening 2-3x/wk.  Insert Revaree 1-3x/wk.  Use coconut oil daily as moisturizer and can also use as lubricant.    Hot flushes:  try Veozah.  Download coupon from .    F/u in 2-3mos.  If all is well, ok to cancel appt and RTO for annual exam.    Patient verbalized understanding of today's discussion with all questions and concerns addressed to her satisfaction.            Assessment/Plan:    1. Encntr for gyn exam (general) (routine) w/o abn findings  2. Encounter for screening mammogram for malignant neoplasm of breast  -     Mammo screening bilateral w 3d & cad; Future; Expected date: 2025  3. Menopausal hot flushes  -     fezolinetant (Veozah) tablet; Take 1 tablet (45 mg total) by mouth daily  4. Vaginal atrophy  -     estradiol (ESTRACE VAGINAL) 0.1 mg/g vaginal cream; Massage dime size amount to vaginal opening 2-3x/wk.          Subjective:      Patient ID: Simi Alejandro is a 53 y.o. female, who presents for an annual exam today.     HPI:    Pt states here for annual exam.  Denies any GYN issues.  Pt is postmenopausal.  Last period was a few yrs ago (age 50 or 51).  Denies any VB since then.     Denies any abnormal nipple or vaginal discharge.     Pt is sexually active in a monogamous relationship with .  Declines STD testing.      Denies any pain with sex. Vaginal dryness. Uses oral Estradiol 1 mg daily for hot flushes.  Denies >10 / day of hot flushes.        GYN History:    Patient's last menstrual period was 2019 (exact date).     OB History          4    Para   4    Term   4            AB        Living   4         SAB        IAB        Ectopic        Multiple        Live Births   4                  Last  pap smear: 7/2022, wnl.  History of abnormal paps: Yes, describe: @ [facility] and had colposcopy in 2016.  Neg HPV, abnormal cells.     Smoking/alcohol/drug use. No  former social smoking, quit 2010    Exercise:  Yes, describe: strength training 2-3x/wk, running 2-3x/wk    Sun exposure: mild, Yes  sunscreen use.    Seat belt use:  Yes , appropriate counseling reviewed.      Last saw Family Physician:  <1 yr, annual scheduled for November     Up to date with vaccines:  No. Did not get Gardisil vaccine.     Last mammogram: 6/2024, BiRads2, TC=not done    Last colonoscopy: 12/2021, due in 2031.     Last DEXA scan: never      The following portions of the patient's history were reviewed and updated as appropriate: allergies, current medications, past family history, past medical history, past social history, past surgical history, and problem list.      Family history:      Family history   is not  positive for breast, uterine, ovarian cancer, colon cancer, or melanoma skin cancer.    Other family history of cancers:  Yes, describe: mat aunt and mat cousin with breast CA.  Mat cousin is not the daughter of the mat aunt with breast CA.         Review of Systems   Constitutional: Negative.    HENT: Negative.     Respiratory: Negative.     Cardiovascular: Negative.    Gastrointestinal: Negative.    Endocrine: Negative.    Genitourinary: Negative.    Musculoskeletal: Negative.    Skin: Negative.    Allergic/Immunologic: Negative.    Neurological: Negative.    Hematological: Negative.    Psychiatric/Behavioral: Negative.     All other systems reviewed and are negative.            Objective:      /76 (BP Location: Left arm, Patient Position: Sitting, Cuff Size: Standard)   Ht 5' (1.524 m)   Wt 64 kg (141 lb)   LMP 05/01/2019 (Exact Date)   BMI 27.54 kg/m²        Physical Exam  HENT:      Head: Normocephalic and atraumatic.   Eyes:      Extraocular Movements: Extraocular movements intact.   Cardiovascular:      Rate  and Rhythm: Normal rate and regular rhythm.   Pulmonary:      Effort: Pulmonary effort is normal.      Breath sounds: Normal breath sounds.   Abdominal:      General: Bowel sounds are normal.      Palpations: Abdomen is soft.   Musculoskeletal:      Cervical back: Neck supple.   Skin:     General: Skin is warm.   Neurological:      Mental Status: She is alert and oriented to person, place, and time.   Psychiatric:         Mood and Affect: Mood normal.         Behavior: Behavior normal.         Thought Content: Thought content normal.         Judgment: Judgment normal.           Cardiac:  murmur appreciated No    Breast exam:  normal, bilateral implants noted.     GYN exam: NEFG, slight atrophic changed at vaginal opening noted.  No CMT, uterine, or adnexal tenderness to palpation.      Wetprep was not performed today.            SHEREEN Amaya MD, FACOG

## 2024-07-30 ENCOUNTER — TELEPHONE (OUTPATIENT)
Age: 54
End: 2024-07-30

## 2024-07-30 NOTE — TELEPHONE ENCOUNTER
PA for Veozah  Approved     Date(s) approved 05/30/2024-07/29/2025    Case #    Patient advised by          [x] Pagahart Message  [] Phone call   []LMOM  []L/M to call office as no active Communication consent on file  []Unable to leave detailed message as VM not approved on Communication consent       Pharmacy advised by    [x]Fax  []Phone call    Approval letter scanned into Media Yes

## 2024-07-30 NOTE — TELEPHONE ENCOUNTER
PA for Veozah SUBMITTED     via    [x]CMM-KEY [id number]  [][email or web address]-Case ID #   []Faxed to plan   []Other website   []Phone call Case ID #     Office notes sent, clinical questions answered. Awaiting determination    Turnaround time for your insurance to make a decision on your Prior Authorization can take 7-21 business days.

## 2024-07-31 ENCOUNTER — APPOINTMENT (OUTPATIENT)
Dept: PHYSICAL THERAPY | Facility: OTHER | Age: 54
End: 2024-07-31
Payer: COMMERCIAL

## 2024-08-01 ENCOUNTER — APPOINTMENT (OUTPATIENT)
Dept: PHYSICAL THERAPY | Facility: OTHER | Age: 54
End: 2024-08-01
Payer: COMMERCIAL

## 2024-08-02 LAB
LAB AP GYN PRIMARY INTERPRETATION: NORMAL
Lab: NORMAL

## 2024-08-05 ENCOUNTER — OFFICE VISIT (OUTPATIENT)
Dept: PHYSICAL THERAPY | Facility: OTHER | Age: 54
End: 2024-08-05
Payer: COMMERCIAL

## 2024-08-05 DIAGNOSIS — M22.2X1 PATELLOFEMORAL PAIN SYNDROME OF BOTH KNEES: Primary | ICD-10-CM

## 2024-08-05 DIAGNOSIS — M76.32 IT BAND SYNDROME, LEFT: ICD-10-CM

## 2024-08-05 DIAGNOSIS — M22.2X2 PATELLOFEMORAL PAIN SYNDROME OF BOTH KNEES: Primary | ICD-10-CM

## 2024-08-05 PROCEDURE — 97140 MANUAL THERAPY 1/> REGIONS: CPT | Performed by: PEDIATRICS

## 2024-08-05 PROCEDURE — 97112 NEUROMUSCULAR REEDUCATION: CPT | Performed by: PEDIATRICS

## 2024-08-05 PROCEDURE — 97110 THERAPEUTIC EXERCISES: CPT | Performed by: PEDIATRICS

## 2024-08-05 NOTE — PROGRESS NOTES
"Daily Note     Today's date: 2024  Patient name: Simi Alejandro  : 1970  MRN: [id number]  Referring provider: Yara Parra MD  Dx:   Encounter Diagnosis     ICD-10-CM    1. Patellofemoral pain syndrome of both knees  M22.2X1     M22.2X2       2. It band syndrome, left  M76.32                    1 on 1 from 500-545, not billed remainder    Subjective: Patient reports more muscle soreness than knee soreness from last session. Continues to have the most difficulty with kneeling.       Objective: See treatment diary below      Assessment: Tolerated treatment well. Patient demonstrated fatigue post treatment, exhibited good technique with therapeutic exercises, and would benefit from continued PT. Continued with SL progressions as performed last session. Challenged with SL pistol squats. When on R leg, shifted body weight to the right with rotation of pelvis. Able to correct with tactile cuing.      Plan: Continue per plan of care.      Precautions: ESWT 1 x a week      Manuals 7/9 7/15 7/23 7/25 8/5        Mercado tape EB            ESWT patellar tendon  2500 p 21 Hz small metal 1.8           ESWT quad tendon   2500 p 21 Hz small metal 1.8  2500 p 21 Hz small metal 1.8        MFDc    Quads and patellar tendon         Neuro Re-Ed             Squat with TRX 10x            Squat with TB resist at knee 10x   30 plum         TRX curtsy    BFR 30, 3 x 15 BFR 30, 3 x 15        TRX pistol    30 BFR 30, 3x15  BFR        Sidestep CLX  2 laps black           Glut med iso@ wall 10x 10\"            BFR Step up, reverse lunge  NV 30 3  x 15, 12\"          Side plank from knee w/ hip abd 4 x 15\"            LAQ BFR  30 1 x 15 blue TB  30 w/ MFDc         Legpress BFR  10# 30 3 x 15 30# 30 3 x 15 30# 30 3 x 15 35#  30, 3x15  BFR        BFR mini lunge             Ther Ex             bike               Sciatic nerve glide 10 x 10\"                                                                                            Ther " Activity                                       Gait Training                                       Modalities

## 2024-08-08 ENCOUNTER — APPOINTMENT (OUTPATIENT)
Dept: PHYSICAL THERAPY | Facility: OTHER | Age: 54
End: 2024-08-08
Payer: COMMERCIAL

## 2024-08-12 ENCOUNTER — OFFICE VISIT (OUTPATIENT)
Dept: PHYSICAL THERAPY | Facility: OTHER | Age: 54
End: 2024-08-12
Payer: COMMERCIAL

## 2024-08-12 DIAGNOSIS — M22.2X1 PATELLOFEMORAL PAIN SYNDROME OF BOTH KNEES: Primary | ICD-10-CM

## 2024-08-12 DIAGNOSIS — M22.2X2 PATELLOFEMORAL PAIN SYNDROME OF BOTH KNEES: Primary | ICD-10-CM

## 2024-08-12 DIAGNOSIS — M76.32 IT BAND SYNDROME, LEFT: ICD-10-CM

## 2024-08-12 PROCEDURE — 97112 NEUROMUSCULAR REEDUCATION: CPT | Performed by: PEDIATRICS

## 2024-08-12 PROCEDURE — 97110 THERAPEUTIC EXERCISES: CPT | Performed by: PEDIATRICS

## 2024-08-12 PROCEDURE — 97140 MANUAL THERAPY 1/> REGIONS: CPT | Performed by: PEDIATRICS

## 2024-08-12 NOTE — PROGRESS NOTES
"Daily Note     Today's date: 2024  Patient name: Simi Alejandro  : 1970  MRN: [id number]  Referring provider: Yara Parra MD  Dx:   Encounter Diagnosis     ICD-10-CM    1. Patellofemoral pain syndrome of both knees  M22.2X1     M22.2X2       2. It band syndrome, left  M76.32                    1 on 1 for duration of treatment session.    Subjective: Patient states she is having L ankle pain and does not know what she did.       Objective: See treatment diary below      Assessment: Tolerated treatment well. Patient demonstrated fatigue post treatment, exhibited good technique with therapeutic exercises, and would benefit from continued PT. More table exercises today secondary to ankle pain. Will resume more functional exercises NV if able to tolerate.      Plan: Continue per plan of care.      Precautions: ESWT 1 x a week      Manuals 7/9 7/15 7/23 7/25 8/5        Mercado tape EB            ESWT patellar tendon  2500 p 21 Hz small metal 1.8           ESWT quad tendon   2500 p 21 Hz small metal 1.8  2500 p 21 Hz small metal 1.8        MFDc    Quads and patellar tendon         Neuro Re-Ed             Squat with TRX 10x            Squat with TB resist at knee 10x   30 plum         TRX curtsy    BFR 30, 3 x 15 BFR 30, 3 x 15        TRX pistol    30 BFR 30, 3x15  BFR        Sidestep CLX  2 laps black           Glut med iso@ wall 10x 10\"            BFR Step up, reverse lunge  NV 30 3  x 15, 12\"          Side plank from knee w/ hip abd 4 x 15\"            LAQ BFR  30 1 x 15 blue TB  30 w/ MFDc         Legpress BFR  10# 30 3 x 15 30# 30 3 x 15 30# 30 3 x 15 35#  30, 3x15  BFR        BFR mini lunge             BFR LAQ      MTB  5#  30, 3x15       BFR sidelying hip abd      5#  30, 3x15       BFR prone bent knee hip ext      5#  30, 3x15       Ther Ex             bike               Sciatic nerve glide 10 x 10\"                                                                                            Ther " Activity                                       Gait Training                                       Modalities

## 2024-08-14 ENCOUNTER — OFFICE VISIT (OUTPATIENT)
Dept: PHYSICAL THERAPY | Facility: OTHER | Age: 54
End: 2024-08-14
Payer: COMMERCIAL

## 2024-08-14 DIAGNOSIS — M22.2X1 PATELLOFEMORAL PAIN SYNDROME OF BOTH KNEES: Primary | ICD-10-CM

## 2024-08-14 DIAGNOSIS — M76.32 IT BAND SYNDROME, LEFT: ICD-10-CM

## 2024-08-14 DIAGNOSIS — M22.2X2 PATELLOFEMORAL PAIN SYNDROME OF BOTH KNEES: Primary | ICD-10-CM

## 2024-08-14 PROCEDURE — 97110 THERAPEUTIC EXERCISES: CPT | Performed by: PEDIATRICS

## 2024-08-14 PROCEDURE — 97112 NEUROMUSCULAR REEDUCATION: CPT | Performed by: PEDIATRICS

## 2024-08-14 PROCEDURE — 97140 MANUAL THERAPY 1/> REGIONS: CPT | Performed by: PEDIATRICS

## 2024-08-14 NOTE — PROGRESS NOTES
"Daily Note     Today's date: 2024  Patient name: Simi Alejandro  : 1970  MRN: [id number]  Referring provider: Yara Parra MD  Dx:   Encounter Diagnosis     ICD-10-CM    1. Patellofemoral pain syndrome of both knees  M22.2X1     M22.2X2       2. It band syndrome, left  M76.32                    1 on 1 for duration of treatment session.    Subjective: Patient states she is still having L ankle pain. Discussed holding on running this weekend.       Objective: See treatment diary below      Assessment: Tolerated treatment well. Patient demonstrated fatigue post treatment, exhibited good technique with therapeutic exercises, and would benefit from continued PT. Continued with BFR ex as outlined. Patient demonstrates greater L sided glute weakness. Would benefit form continued bilateral LE strengthening.       Plan: Continue per plan of care.      Precautions: ESWT 1 x a week      Manuals 7/9 7/15 7/23 7/25 8/5 8/12 8/14      Mercado tape EB            ESWT patellar tendon  2500 p 21 Hz small metal 1.8           ESWT quad tendon   2500 p 21 Hz small metal 1.8 nv 2500 p 21 Hz small metal 1.8 nv 2500 p 21 Hz small metal 1.8 nv     MFDc    Quads and patellar tendon         Neuro Re-Ed             Squat with TRX 10x            Squat with TB resist at knee 10x   30 plum         TRX curtsy    BFR 30, 3 x 15 BFR 30, 3 x 15        TRX pistol    30 BFR 30, 3x15  BFR        Sidestep CLX  2 laps black           Glut med iso@ wall 10x 10\"            BFR Step up, reverse lunge  NV 30 3  x 15, 12\"          Side plank from knee w/ hip abd 4 x 15\"            LAQ BFR  30 1 x 15 blue TB  30 w/ MFDc         Legpress BFR  10# 30 3 x 15 30# 30 3 x 15 30# 30 3 x 15 35#  30, 3x15  BFR  40#  30, 3x15      BFR mini lunge             BFR LAQ      MTB  5#  30, 3x15 MTB  5#  30, 3x15      BFR sidelying hip abd      5#  30, 3x15 5#  30, 3x15      BFR prone bent knee hip ext      5#  30, 3x15   30, 3x15      Ther Ex             bike  " "             Sciatic nerve glide 10 x 10\"                                                                                            Ther Activity                                       Gait Training                                       Modalities                                            "

## 2024-08-15 DIAGNOSIS — N95.1 VASOMOTOR SYMPTOMS DUE TO MENOPAUSE: ICD-10-CM

## 2024-08-15 RX ORDER — BUPROPION HYDROCHLORIDE 150 MG/1
150 TABLET, EXTENDED RELEASE ORAL 2 TIMES DAILY
Qty: 180 TABLET | Refills: 1 | Status: SHIPPED | OUTPATIENT
Start: 2024-08-15

## 2024-08-21 ENCOUNTER — APPOINTMENT (OUTPATIENT)
Dept: PHYSICAL THERAPY | Facility: OTHER | Age: 54
End: 2024-08-21
Payer: COMMERCIAL

## 2024-08-28 ENCOUNTER — OFFICE VISIT (OUTPATIENT)
Dept: PHYSICAL THERAPY | Facility: OTHER | Age: 54
End: 2024-08-28
Payer: COMMERCIAL

## 2024-08-28 DIAGNOSIS — M22.2X2 PATELLOFEMORAL PAIN SYNDROME OF BOTH KNEES: Primary | ICD-10-CM

## 2024-08-28 DIAGNOSIS — M76.32 IT BAND SYNDROME, LEFT: ICD-10-CM

## 2024-08-28 DIAGNOSIS — M22.2X1 PATELLOFEMORAL PAIN SYNDROME OF BOTH KNEES: Primary | ICD-10-CM

## 2024-08-28 PROCEDURE — 97112 NEUROMUSCULAR REEDUCATION: CPT | Performed by: PEDIATRICS

## 2024-08-28 PROCEDURE — 97140 MANUAL THERAPY 1/> REGIONS: CPT | Performed by: PEDIATRICS

## 2024-08-28 NOTE — PROGRESS NOTES
"Daily Note     Today's date: 2024  Patient name: Simi Alejandro  : 1970  MRN: [id number]  Referring provider: Yara Parra MD  Dx:   Encounter Diagnosis     ICD-10-CM    1. Patellofemoral pain syndrome of both knees  M22.2X1     M22.2X2       2. It band syndrome, left  M76.32                    1 on 1 for duration of treatment session.    Subjective: Patient states she was able to kneel without holding on and without a lot of pain.       Objective: See treatment diary below      Assessment: Tolerated treatment well. Patient demonstrated fatigue post treatment, exhibited good technique with therapeutic exercises, and would benefit from continued PT. Continued with BFR ex as outlined. Appropriately challenged. Will continue to benefit from quad and glute strengthening.       Plan: Continue per plan of care.      Precautions: ESWT 1 x a week      Manuals 7/9 7/15 7/23 7/25 8/5 8/12 8/14 8/28     Mercado tape EB            ESWT patellar tendon  2500 p 21 Hz small metal 1.8           ESWT quad tendon   2500 p 21 Hz small metal 1.8 nv 2500 p 21 Hz small metal 1.8 nv 2500 p 21 Hz small metal 1.8 Missed last week, performed today    2500 p 21 Hz small metal 1.8     MFDc    Quads and patellar tendon         Neuro Re-Ed             Squat with TRX 10x            Squat with TB resist at knee 10x   30 plum         TRX curtsy    BFR 30, 3 x 15 BFR 30, 3 x 15        TRX pistol    30 BFR 30, 3x15  BFR        Sidestep CLX  2 laps black           Glut med iso@ wall 10x 10\"            BFR Step up, reverse lunge  NV 30 3  x 15, 12\"          Side plank from knee w/ hip abd 4 x 15\"            LAQ BFR  30 1 x 15 blue TB  30 w/ MFDc         Legpress BFR  10# 30 3 x 15 30# 30 3 x 15 30# 30 3 x 15 35#  30, 3x15  BFR  40#  30, 3x15 45#  30, 3x15  B/l     BFR mini lunge             BFR LAQ      MTB  5#  30, 3x15 MTB  5#  30, 3x15 MTB  7#  30, 3x15  B/l     BFR sidelying hip abd      5#  30, 3x15 5#  30, 3x15      BFR prone " "bent knee hip ext      5#  30, 3x15   30, 3x15      BFR SL bridge        4x15  B/l     BFR resisted side stepping        MTB  1'x4       Ther Ex             bike               Sciatic nerve glide 10 x 10\"                                                                                            Ther Activity                                       Gait Training                                       Modalities                                            "

## 2024-08-29 ENCOUNTER — APPOINTMENT (OUTPATIENT)
Dept: PHYSICAL THERAPY | Facility: OTHER | Age: 54
End: 2024-08-29
Payer: COMMERCIAL

## 2024-09-04 ENCOUNTER — TELEPHONE (OUTPATIENT)
Dept: OBGYN CLINIC | Facility: OTHER | Age: 54
End: 2024-09-04

## 2024-09-04 ENCOUNTER — OFFICE VISIT (OUTPATIENT)
Dept: OBGYN CLINIC | Facility: OTHER | Age: 54
End: 2024-09-04
Payer: COMMERCIAL

## 2024-09-04 VITALS
BODY MASS INDEX: 27.7 KG/M2 | HEIGHT: 60 IN | DIASTOLIC BLOOD PRESSURE: 77 MMHG | WEIGHT: 141.09 LBS | HEART RATE: 66 BPM | SYSTOLIC BLOOD PRESSURE: 112 MMHG

## 2024-09-04 DIAGNOSIS — M22.2X2 PATELLOFEMORAL PAIN SYNDROME OF BOTH KNEES: Primary | ICD-10-CM

## 2024-09-04 DIAGNOSIS — M22.2X1 PATELLOFEMORAL PAIN SYNDROME OF BOTH KNEES: Primary | ICD-10-CM

## 2024-09-04 PROCEDURE — 99213 OFFICE O/P EST LOW 20 MIN: CPT | Performed by: ORTHOPAEDIC SURGERY

## 2024-09-04 NOTE — PROGRESS NOTES
Assessment:       1. Patellofemoral pain syndrome of both knees          Plan:        I had a detailed discussion with the patient with regards to her persistent bilateral anterior knee pain.  Her clinical exam is consistent with bilateral patellofemoral pain syndrome and I also have some suspicion of patellofemoral early arthritic changes clinically.  She only had a brief period of relief with intra-articular corticosteroid injections.  Hence, I discussed the possibility of viscosupplementation injection or platelet rich plasma injection in this regard.  The risks and benefits of these procedures were explained to the patient.  Patient wishes to proceed with ultrasound-guided bilateral knee PRP injection.  She was explained that this modality is considered experimental and not covered through insurance.  A printed handout for the same was provided to the patient.  She will be accordingly scheduled for bilateral knee PRP injection with ultrasound guidance accuracy.            Subjective:     Patient ID: Simi Alejandro is a 53 y.o. female.    Chief Complaint:    ANKIT Belcher presents today for a follow-up of her bilateral knee pain.  She was last seen in this regard on 6/26/2024 and noted to have bilateral knee patellofemoral pain syndrome.  Prior to her last office visit with me she had also been evaluated externally at [facility] and received bilateral knee intra-articular corticosteroid injections which provided her brief period of relief only.  She also did a course of physical therapy rehabilitation but continues with bilateral anterior knee discomfort.  She denies any mechanical symptoms like locking or significant instability or knee swelling.  Denies any new knee injury since the last office visit.  At the last visit, she was suggested to have a Lyme screen as well as uric acid level tested which the patient has not yet done.     Social History     Occupational History    Not on file    Tobacco Use    Smoking status: Former     Current packs/day: 0.00     Types: Cigarettes     Start date: 1999     Quit date: 2005     Years since quittin.9    Smokeless tobacco: Never    Tobacco comments:     I would only smoke maybe once or twice a week, if that. it was mostly socially, out for some drinks   Vaping Use    Vaping status: Never Used   Substance and Sexual Activity    Alcohol use: Not Currently     Comment: I do not drink alcohol    Drug use: Not Currently     Types: Cocaine, Marijuana    Sexual activity: Yes     Partners: Male     Birth control/protection: Spermicide, Post-menopausal      Review of Systems        Objective:     Right Knee Exam     Tenderness   The patient is experiencing tenderness in the patellar tendon (Patellofemoral crepitus and tenderness).    Range of Motion   Extension:  normal   Flexion:  normal Right knee flexion: Pain in terminal flexion.    Tests   Libby:  Medial - negative Lateral - negative  Varus: negative Valgus: negative  Lachman:  Anterior - negative      Drawer:  Anterior - negative    Posterior - negative  Patellar apprehension: negative    Other   Erythema: absent  Sensation: normal  Effusion: no effusion present      Left Knee Exam     Tenderness   The patient is experiencing tenderness in the patellar tendon (Patellofemoral crepitus and tenderness).    Range of Motion   Extension:  normal   Flexion:  normal Left knee flexion: Pain in terminal flexion.    Tests   Libby:  Medial - negative Lateral - negative  Varus: negative Valgus: negative  Lachman:  Anterior - negative      Drawer:  Anterior - negative     Posterior - negative  Patellar apprehension: negative    Other   Erythema: absent  Sensation: normal  Effusion: no effusion present          Observations   Left Knee   Negative for effusion.     Right Knee   Negative for effusion.   Physical Exam  Vitals and nursing note reviewed.   Constitutional:       Appearance: She is  well-developed.   HENT:      Head: Normocephalic.   Cardiovascular:      Rate and Rhythm: Normal rate.   Pulmonary:      Effort: Pulmonary effort is normal. No respiratory distress.   Musculoskeletal:      Right knee: No effusion.      Instability Tests: Medial Libby test negative and lateral Libby test negative.      Left knee: No effusion.      Instability Tests: Medial Libby test negative and lateral Libby test negative.   Skin:     General: Skin is warm and dry.      Findings: No erythema.   Neurological:      Mental Status: She is alert and oriented to person, place, and time.      Cranial Nerves: No cranial nerve deficit.   Psychiatric:         Behavior: Behavior normal.         Thought Content: Thought content normal.         Judgment: Judgment normal.

## 2024-09-04 NOTE — PATIENT INSTRUCTIONS
PRP (Platelet Rich Plasma) Injection      What is PRP?  Platelet?rich plasma is a fraction of your blood which contains a high concentration of platelets than whole blood. PRP therapy involves the injection of this platelet rich concentrate, rich in growth factors and nutrients, to enhance the natural healing response of an injured tissue.    What are the different orthopedic conditions for which PRP may be used?  PRP therapy may be used for conditions like osteoarthritis (degenerative joint disease), chronic tendinitis or chronic ligament and muscle injuries. Some examples include knee osteoarthritis, lateral (tennis elbow) or medial epicondylitis (golfer's elbow) of elbow, patellar or Achilles tendinitis, plantar fasciitis, chronic ligament sprains, etc.    Is PRP therapy right for you?  If you have tried conventional treatment methods (e.g. physical therapy, oral pain medications, bracing, etc.) and persist with chronic joint, muscle or tendon pain; then PRP therapy may be an option. Since every individual and condition is unique, it's best to be clinically evaluated by your physician to determine if PRP is a good option for you.    What are some contraindications for PRP therapy?  It's best to discuss your medical conditions and medications with your physician prior to receiving PRP therapy. Some medical conditions may not be appropriate for PRP therapy e.g. blood disorders, anemia or low platelet count, immunological problems, cancer, active or prolonged infections, pregnancy, chronic smoking.  Certain medications may lower the efficacy of PRP therapy. Examples include aspirin, NSAID's (Motrin, Advil, Aleve, Mobic/ ibuprofen, naproxen, diclofenac, meloxicam, etc.), blood thinners.     What is the scientific evidence for PRP therapy?  The scientific evidence for PRP is evolving. The position statement from [employer], published in 2021 indicates that PRP is effective in reducing  pain and improving function in patients with knee osteoarthritis, particularly in those with mild to moderate disease or younger individuals. Evidence for osteoarthritis of other joints is evolving. There is also some evidence for pain reduction in conditions such as lateral epicondylitis and other tendonitis. The evidence regarding PRP use for muscle or ligament injuries is limited.     What are the risks associated with PRP injection?  PRP injections are relatively low risk since it's your own blood. Common risks include transient injection site increased pain, infection risk. You should discuss the potential risks for your PRP injection with your physician since it could vary based on your specific condition and injection site.    What can I do to optimize my PRP therapy?  Pre-procedure:   Discuss your physician if you need to withhold any medications e.g. blood thinners, pain medications before the procedure.  Stay well hydrated on the day of procedure.  Inform physician of any recent medication change or infection.  If able to, perform some light exercise prior to the procedure. Some studies show improved platelet concentration following physical activity.  Post-procedure:  Avoid/ minimize taking NSAID's and blood thinners as per the direction of your treating physician.  Keep the injection area clean and dry and avoid submerging under water e.g. bathtub, swimming pool, ocean water for 4-5 days. May take a shower next day.  You may experience some increased discomfort in the area where PRP was injected. This may last from a few days to 2-3 weeks. You can apply local ice for comfort and discuss medication options with your physician for any increased pain.  It's advisable to have someone accompany you for the office visit as you may need help in driving following the injection.   Avoid strenuous exercise of the injected area until symptoms improve and following the guidance of your treating physician/ physical  therapist.  Contact treating physician immediately if there is significant increase in injection area pain associated with spreading redness, fever or chills.     How is the procedure performed?  A blood sample is taken from the patient like having a lab test for blood. This sample is then placed in a centrifuge machine which separates the blood in different components. One component, platelet rich plasm (PRP) is carefully extracted from the centrifuged sample. This is then injected in the tissue/joint of concern. Sterile precautions are maintained throughout the procedure. Your physician may use an ultrasound machine to accurately deliver the injection into the tissue/joint of concern.     Do I need to do physical therapy after the PRP injection?  Several studies have demonstrated synergistic effect of physical therapy with PRP injection. Physical therapy after PRP is typically customized based on patient's individual scenario. Discuss with your physician about need and duration of physical therapy following your PRP injection.    When can I expect improvement of symptoms, and will I need further PRP injections?  Symptom improvement can be variable among individuals. Commonly, most people will start noticing improvement in 6-8 weeks following the injection. If there is partial improvement of symptoms, you may opt for another PRP injection typically after 6-8 weeks.    Insurance coverage/Cost  Most traditional insurance companies currently do not cover the cost of PRP injection as it is not “FDA approved” and considered “FDA cleared”. Since PRP is derived from patient's own blood, it's not considered a drug by FDA. “FDA clearance” means that a device has been noted to be “substantially equivalent” to a currently marketed device. The cost of injection may vary depending on type and number of PRP injections. Please confirm from your physician's office, the exact cost and mode of payment for your PRP injection prior to  scheduling the procedure.

## 2024-10-24 DIAGNOSIS — N95.1 MENOPAUSAL SYMPTOMS: ICD-10-CM

## 2024-10-24 RX ORDER — ESTRADIOL 1 MG/1
TABLET ORAL
Qty: 90 TABLET | Refills: 1 | Status: SHIPPED | OUTPATIENT
Start: 2024-10-24

## 2024-11-08 DIAGNOSIS — N95.1 MENOPAUSAL SYMPTOMS: ICD-10-CM

## 2024-11-08 DIAGNOSIS — N95.1 VASOMOTOR SYMPTOMS DUE TO MENOPAUSE: ICD-10-CM

## 2024-11-08 RX ORDER — ESTRADIOL 1 MG/1
1 TABLET ORAL DAILY
Qty: 90 TABLET | Refills: 0 | Status: SHIPPED | OUTPATIENT
Start: 2024-11-08

## 2024-11-08 NOTE — TELEPHONE ENCOUNTER
Reason for call:  Annabelle Barrios MD manage this medication! Dr Carlito Hope manage the progesterone! Same office.    [x] Refill   [] Prior Auth  [] Other:     Office:   [] PCP/Provider -   [x] Specialty/Provider -     Medication:     estradiol (ESTRACE) 1 mg tablet       Dose/Frequency: TAKE 1 TABLET BY MOUTH EVERY DAY     Quantity:  90 tablet     Medication:    Progesterone 100 MG CAPS     Dose/Frequency: TAKE 1 CAPSULE BY MOUTH EVERYDAY AT BEDTIME,     Quantity:: 90 capsule     Pharmacy: [facility]/pharmacy #[id number]  [place] - [address] [phone]     Does the patient have enough for 3 days?   [] Yes   [x] No - Send as HP to POD

## 2024-11-14 RX ORDER — PROGESTERONE 100 MG/1
1 CAPSULE ORAL
Qty: 90 CAPSULE | Refills: 0 | Status: SHIPPED | OUTPATIENT
Start: 2024-11-14

## 2025-02-05 DIAGNOSIS — N95.1 MENOPAUSAL SYMPTOMS: ICD-10-CM

## 2025-02-05 RX ORDER — ESTRADIOL 1 MG/1
1 TABLET ORAL DAILY
Qty: 90 TABLET | Refills: 1 | Status: SHIPPED | OUTPATIENT
Start: 2025-02-05

## 2025-05-21 DIAGNOSIS — N95.1 VASOMOTOR SYMPTOMS DUE TO MENOPAUSE: ICD-10-CM

## 2025-05-21 RX ORDER — PROGESTERONE 100 MG/1
1 CAPSULE ORAL
Qty: 90 CAPSULE | Refills: 0 | Status: SHIPPED | OUTPATIENT
Start: 2025-05-21

## 2025-05-28 DIAGNOSIS — N95.1 VASOMOTOR SYMPTOMS DUE TO MENOPAUSE: ICD-10-CM

## 2025-05-28 RX ORDER — BUPROPION HYDROCHLORIDE 150 MG/1
150 TABLET, EXTENDED RELEASE ORAL 2 TIMES DAILY
Qty: 180 TABLET | Refills: 0 | Status: SHIPPED | OUTPATIENT
Start: 2025-05-28

## 2025-05-28 NOTE — TELEPHONE ENCOUNTER
Reason for call:   [x] Refill   [] Prior Auth  [] Other:     Office:   [] PCP/Provider -   [x] Specialty/Provider -  GYN    Medication: Wellbutrin    Frequency: 150 mg    Quantity: 180 tablets    Pharmacy: [facility]/pharmacy #[id number] - [place] - [address] [place] [phone]     Local Pharmacy   Does the patient have enough for 3 days?   [] Yes   [x] No - Send as HP to POD    Mail Away Pharmacy   Does the patient have enough for 10 days?   [] Yes   [] No - Send as HP to POD

## 2025-07-14 ENCOUNTER — TELEPHONE (OUTPATIENT)
Age: 55
End: 2025-07-14

## 2025-08-05 ENCOUNTER — ANNUAL EXAM (OUTPATIENT)
Age: 55
End: 2025-08-05
Payer: COMMERCIAL

## 2025-08-05 VITALS
DIASTOLIC BLOOD PRESSURE: 76 MMHG | HEIGHT: 60 IN | WEIGHT: 140.2 LBS | BODY MASS INDEX: 27.52 KG/M2 | SYSTOLIC BLOOD PRESSURE: 112 MMHG

## 2025-08-05 DIAGNOSIS — N95.2 VAGINAL ATROPHY: ICD-10-CM

## 2025-08-05 DIAGNOSIS — N95.1 MENOPAUSAL SYMPTOMS: ICD-10-CM

## 2025-08-05 DIAGNOSIS — B00.9 HSV-1 INFECTION: ICD-10-CM

## 2025-08-05 DIAGNOSIS — Z01.419 ENCOUNTER FOR ANNUAL ROUTINE GYNECOLOGICAL EXAMINATION: Primary | ICD-10-CM

## 2025-08-05 DIAGNOSIS — N95.1 VASOMOTOR SYMPTOMS DUE TO MENOPAUSE: ICD-10-CM

## 2025-08-05 DIAGNOSIS — Z12.31 ENCOUNTER FOR SCREENING MAMMOGRAM FOR MALIGNANT NEOPLASM OF BREAST: ICD-10-CM

## 2025-08-05 PROCEDURE — S0612 ANNUAL GYNECOLOGICAL EXAMINA: HCPCS | Performed by: OBSTETRICS & GYNECOLOGY

## 2025-08-05 RX ORDER — ESTRADIOL 1 MG/1
1 TABLET ORAL DAILY
Qty: 90 TABLET | Refills: 3 | Status: SHIPPED | OUTPATIENT
Start: 2025-08-05

## 2025-08-05 RX ORDER — PROGESTERONE 200 MG/1
200 CAPSULE ORAL
Qty: 90 CAPSULE | Refills: 3 | Status: SHIPPED | OUTPATIENT
Start: 2025-08-05

## 2025-08-05 RX ORDER — VALACYCLOVIR HYDROCHLORIDE 500 MG/1
500 TABLET, FILM COATED ORAL DAILY
Qty: 90 TABLET | Refills: 3 | Status: SHIPPED | OUTPATIENT
Start: 2025-08-05 | End: 2026-08-05

## 2025-08-05 RX ORDER — BUPROPION HYDROCHLORIDE 150 MG/1
150 TABLET, EXTENDED RELEASE ORAL 2 TIMES DAILY
Qty: 180 TABLET | Refills: 3 | Status: SHIPPED | OUTPATIENT
Start: 2025-08-05

## 2025-08-05 RX ORDER — ESTRADIOL 0.1 MG/G
CREAM VAGINAL
Qty: 42.5 G | Refills: 3 | Status: SHIPPED | OUTPATIENT
Start: 2025-08-05